# Patient Record
Sex: FEMALE | Race: WHITE | NOT HISPANIC OR LATINO
[De-identification: names, ages, dates, MRNs, and addresses within clinical notes are randomized per-mention and may not be internally consistent; named-entity substitution may affect disease eponyms.]

---

## 2017-01-04 ENCOUNTER — TRANSCRIPTION ENCOUNTER (OUTPATIENT)
Age: 22
End: 2017-01-04

## 2017-02-06 ENCOUNTER — APPOINTMENT (OUTPATIENT)
Dept: INTERNAL MEDICINE | Facility: CLINIC | Age: 22
End: 2017-02-06

## 2017-03-16 ENCOUNTER — APPOINTMENT (OUTPATIENT)
Dept: ENDOCRINOLOGY | Facility: CLINIC | Age: 22
End: 2017-03-16

## 2017-05-22 ENCOUNTER — OUTPATIENT (OUTPATIENT)
Dept: OUTPATIENT SERVICES | Facility: HOSPITAL | Age: 22
LOS: 1 days | Discharge: HOME | End: 2017-05-22

## 2017-06-28 DIAGNOSIS — R89.7 ABNORMAL HISTOLOGICAL FINDINGS IN SPECIMENS FROM OTHER ORGANS, SYSTEMS AND TISSUES: ICD-10-CM

## 2017-09-17 ENCOUNTER — EMERGENCY (EMERGENCY)
Facility: HOSPITAL | Age: 22
LOS: 0 days | Discharge: HOME | End: 2017-09-17
Admitting: PEDIATRICS

## 2017-09-17 DIAGNOSIS — Z79.899 OTHER LONG TERM (CURRENT) DRUG THERAPY: ICD-10-CM

## 2017-09-17 DIAGNOSIS — J45.909 UNSPECIFIED ASTHMA, UNCOMPLICATED: ICD-10-CM

## 2017-09-17 DIAGNOSIS — R11.2 NAUSEA WITH VOMITING, UNSPECIFIED: ICD-10-CM

## 2017-09-17 DIAGNOSIS — Z91.018 ALLERGY TO OTHER FOODS: ICD-10-CM

## 2017-09-17 DIAGNOSIS — K21.9 GASTRO-ESOPHAGEAL REFLUX DISEASE WITHOUT ESOPHAGITIS: ICD-10-CM

## 2017-09-17 DIAGNOSIS — Z91.010 ALLERGY TO PEANUTS: ICD-10-CM

## 2017-10-02 ENCOUNTER — OUTPATIENT (OUTPATIENT)
Dept: OUTPATIENT SERVICES | Facility: HOSPITAL | Age: 22
LOS: 1 days | Discharge: HOME | End: 2017-10-02

## 2017-10-02 DIAGNOSIS — Z32.00 ENCOUNTER FOR PREGNANCY TEST, RESULT UNKNOWN: ICD-10-CM

## 2017-10-02 DIAGNOSIS — D50.9 IRON DEFICIENCY ANEMIA, UNSPECIFIED: ICD-10-CM

## 2017-10-13 ENCOUNTER — EMERGENCY (EMERGENCY)
Facility: HOSPITAL | Age: 22
LOS: 0 days | Discharge: HOME | End: 2017-10-13
Admitting: PEDIATRICS

## 2017-10-13 DIAGNOSIS — Z91.018 ALLERGY TO OTHER FOODS: ICD-10-CM

## 2017-10-13 DIAGNOSIS — R11.2 NAUSEA WITH VOMITING, UNSPECIFIED: ICD-10-CM

## 2017-10-13 DIAGNOSIS — Z91.010 ALLERGY TO PEANUTS: ICD-10-CM

## 2017-10-13 DIAGNOSIS — Z79.899 OTHER LONG TERM (CURRENT) DRUG THERAPY: ICD-10-CM

## 2017-10-13 DIAGNOSIS — J45.909 UNSPECIFIED ASTHMA, UNCOMPLICATED: ICD-10-CM

## 2017-10-13 DIAGNOSIS — R19.7 DIARRHEA, UNSPECIFIED: ICD-10-CM

## 2017-10-16 ENCOUNTER — OUTPATIENT (OUTPATIENT)
Dept: OUTPATIENT SERVICES | Facility: HOSPITAL | Age: 22
LOS: 1 days | Discharge: HOME | End: 2017-10-16

## 2017-10-16 DIAGNOSIS — Z00.129 ENCOUNTER FOR ROUTINE CHILD HEALTH EXAMINATION WITHOUT ABNORMAL FINDINGS: ICD-10-CM

## 2017-10-16 DIAGNOSIS — Z00.00 ENCOUNTER FOR GENERAL ADULT MEDICAL EXAMINATION WITHOUT ABNORMAL FINDINGS: ICD-10-CM

## 2017-11-02 ENCOUNTER — OUTPATIENT (OUTPATIENT)
Dept: OUTPATIENT SERVICES | Facility: HOSPITAL | Age: 22
LOS: 1 days | Discharge: HOME | End: 2017-11-02

## 2017-11-02 DIAGNOSIS — Z00.00 ENCOUNTER FOR GENERAL ADULT MEDICAL EXAMINATION WITHOUT ABNORMAL FINDINGS: ICD-10-CM

## 2017-11-16 ENCOUNTER — EMERGENCY (EMERGENCY)
Facility: HOSPITAL | Age: 22
LOS: 0 days | Discharge: HOME | End: 2017-11-16
Admitting: PEDIATRICS

## 2017-11-16 DIAGNOSIS — R10.30 LOWER ABDOMINAL PAIN, UNSPECIFIED: ICD-10-CM

## 2017-11-16 DIAGNOSIS — K21.9 GASTRO-ESOPHAGEAL REFLUX DISEASE WITHOUT ESOPHAGITIS: ICD-10-CM

## 2017-11-16 DIAGNOSIS — R11.2 NAUSEA WITH VOMITING, UNSPECIFIED: ICD-10-CM

## 2017-11-16 DIAGNOSIS — J45.909 UNSPECIFIED ASTHMA, UNCOMPLICATED: ICD-10-CM

## 2017-11-16 DIAGNOSIS — Z91.010 ALLERGY TO PEANUTS: ICD-10-CM

## 2017-11-16 DIAGNOSIS — Z91.018 ALLERGY TO OTHER FOODS: ICD-10-CM

## 2017-12-18 ENCOUNTER — EMERGENCY (EMERGENCY)
Facility: HOSPITAL | Age: 22
LOS: 0 days | Discharge: HOME | End: 2017-12-19
Admitting: PEDIATRICS

## 2017-12-18 DIAGNOSIS — J45.909 UNSPECIFIED ASTHMA, UNCOMPLICATED: ICD-10-CM

## 2017-12-18 DIAGNOSIS — Z91.018 ALLERGY TO OTHER FOODS: ICD-10-CM

## 2017-12-18 DIAGNOSIS — Z79.51 LONG TERM (CURRENT) USE OF INHALED STEROIDS: ICD-10-CM

## 2017-12-18 DIAGNOSIS — R19.7 DIARRHEA, UNSPECIFIED: ICD-10-CM

## 2017-12-18 DIAGNOSIS — K52.9 NONINFECTIVE GASTROENTERITIS AND COLITIS, UNSPECIFIED: ICD-10-CM

## 2017-12-18 DIAGNOSIS — K21.9 GASTRO-ESOPHAGEAL REFLUX DISEASE WITHOUT ESOPHAGITIS: ICD-10-CM

## 2017-12-18 DIAGNOSIS — Z91.010 ALLERGY TO PEANUTS: ICD-10-CM

## 2017-12-18 DIAGNOSIS — Z79.899 OTHER LONG TERM (CURRENT) DRUG THERAPY: ICD-10-CM

## 2018-02-27 ENCOUNTER — OUTPATIENT (OUTPATIENT)
Dept: OUTPATIENT SERVICES | Facility: HOSPITAL | Age: 23
LOS: 1 days | Discharge: HOME | End: 2018-02-27

## 2018-02-28 DIAGNOSIS — F33.1 MAJOR DEPRESSIVE DISORDER, RECURRENT, MODERATE: ICD-10-CM

## 2018-03-09 ENCOUNTER — OUTPATIENT (OUTPATIENT)
Dept: OUTPATIENT SERVICES | Facility: HOSPITAL | Age: 23
LOS: 1 days | Discharge: HOME | End: 2018-03-09

## 2018-03-09 DIAGNOSIS — F33.1 MAJOR DEPRESSIVE DISORDER, RECURRENT, MODERATE: ICD-10-CM

## 2018-03-12 ENCOUNTER — EMERGENCY (EMERGENCY)
Facility: HOSPITAL | Age: 23
LOS: 0 days | Discharge: HOME | End: 2018-03-13
Attending: EMERGENCY MEDICINE | Admitting: PEDIATRICS

## 2018-03-12 VITALS
RESPIRATION RATE: 18 BRPM | DIASTOLIC BLOOD PRESSURE: 81 MMHG | TEMPERATURE: 98 F | HEART RATE: 78 BPM | OXYGEN SATURATION: 98 % | SYSTOLIC BLOOD PRESSURE: 131 MMHG
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
## 2018-03-12 DIAGNOSIS — F17.210 NICOTINE DEPENDENCE, CIGARETTES, UNCOMPLICATED: ICD-10-CM

## 2018-03-12 DIAGNOSIS — T74.21XA ADULT SEXUAL ABUSE, CONFIRMED, INITIAL ENCOUNTER: ICD-10-CM

## 2018-03-12 RX ORDER — AZITHROMYCIN 500 MG/1
1 TABLET, FILM COATED ORAL DAILY
Qty: 0 | Refills: 0 | Status: DISCONTINUED | OUTPATIENT
Start: 2018-03-12 | End: 2018-03-13

## 2018-03-12 RX ORDER — RALTEGRAVIR 400 MG/1
400 TABLET, FILM COATED ORAL ONCE
Qty: 0 | Refills: 0 | Status: COMPLETED | OUTPATIENT
Start: 2018-03-12 | End: 2018-03-12

## 2018-03-12 RX ORDER — LEVONORGESTREL 1.5 MG/1
1.5 TABLET ORAL ONCE
Qty: 0 | Refills: 0 | Status: COMPLETED | OUTPATIENT
Start: 2018-03-12 | End: 2018-03-12

## 2018-03-12 RX ORDER — CEFTRIAXONE 500 MG/1
250 INJECTION, POWDER, FOR SOLUTION INTRAMUSCULAR; INTRAVENOUS ONCE
Qty: 0 | Refills: 0 | Status: COMPLETED | OUTPATIENT
Start: 2018-03-12 | End: 2018-03-12

## 2018-03-12 RX ORDER — EMTRICITABINE AND TENOFOVIR DISOPROXIL FUMARATE 200; 300 MG/1; MG/1
1 TABLET, FILM COATED ORAL DAILY
Qty: 0 | Refills: 0 | Status: DISCONTINUED | OUTPATIENT
Start: 2018-03-12 | End: 2018-03-13

## 2018-03-12 RX ORDER — METRONIDAZOLE 500 MG
2000 TABLET ORAL ONCE
Qty: 0 | Refills: 0 | Status: COMPLETED | OUTPATIENT
Start: 2018-03-12 | End: 2018-03-12

## 2018-03-12 RX ORDER — METRONIDAZOLE 500 MG
2000 TABLET ORAL ONCE
Qty: 0 | Refills: 0 | Status: DISCONTINUED | OUTPATIENT
Start: 2018-03-12 | End: 2018-03-12

## 2018-03-12 NOTE — ED PROVIDER NOTE - PHYSICAL EXAMINATION
CONSTITUTIONAL: Well-developed; well-nourished; in no acute distress.   SKIN: warm, dry  HEAD: Normocephalic; atraumatic.  EYES: no conj injection  CARD: S1, S2 normal; no murmurs, gallops, or rubs. Regular rate and rhythm.   RESP: No wheezes, rales or rhonchi.  ABD: soft ntnd  EXT: Normal ROM.  No clubbing, cyanosis or edema.   NEURO: Alert, oriented, grossly unremarkable  PSYCH: Cooperative, appropriate. CONSTITUTIONAL: Well-developed; well-nourished; in no acute distress.   SKIN: warm, dry  HEAD: Normocephalic; atraumatic.  EYES: no conj injection  CARD: S1, S2 normal; no murmurs, gallops, or rubs. Regular rate and rhythm.   RESP: No wheezes, rales or rhonchi.  ABD: soft ntnd  : Chaperoned Vee Levybashirbrittany nl appearing external genitalia, no lesions or lacerations. No CMT no vaginal bleeding no discharge.   EXT: Normal ROM.  No clubbing, cyanosis or edema.   NEURO: Alert, oriented, grossly unremarkable  PSYCH: Cooperative, appropriate.

## 2018-03-12 NOTE — ED PROVIDER NOTE - NS ED ROS FT
Eyes:  No visual changes  ENMT:  No neck pain or stiffness.  Cardiac:  No chest pain, SOB   Respiratory:  No cough   GI:  No nausea, vomiting, diarrhea or abdominal pain.  MS:  No  back pain.  Neuro:  No headache  No LOC.  Skin:  No skin rash.
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
## 2018-03-13 VITALS
RESPIRATION RATE: 20 BRPM | HEART RATE: 78 BPM | DIASTOLIC BLOOD PRESSURE: 65 MMHG | OXYGEN SATURATION: 98 % | SYSTOLIC BLOOD PRESSURE: 110 MMHG

## 2018-03-13 LAB
ALBUMIN SERPL ELPH-MCNC: 4.3 G/DL — SIGNIFICANT CHANGE UP (ref 3–5.5)
ALP SERPL-CCNC: 69 U/L — SIGNIFICANT CHANGE UP (ref 30–115)
ALT FLD-CCNC: 15 U/L — SIGNIFICANT CHANGE UP (ref 0–41)
AMYLASE P1 CFR SERPL: 29 U/L — SIGNIFICANT CHANGE UP (ref 25–115)
ANION GAP SERPL CALC-SCNC: 10 MMOL/L — SIGNIFICANT CHANGE UP (ref 7–14)
AST SERPL-CCNC: 21 U/L — SIGNIFICANT CHANGE UP (ref 0–41)
BASOPHILS # BLD AUTO: 0.04 K/UL — SIGNIFICANT CHANGE UP (ref 0–0.2)
BASOPHILS NFR BLD AUTO: 0.5 % — SIGNIFICANT CHANGE UP (ref 0–1)
BILIRUB SERPL-MCNC: 0.8 MG/DL — SIGNIFICANT CHANGE UP (ref 0.2–1.2)
BUN SERPL-MCNC: 7 MG/DL — LOW (ref 10–20)
CALCIUM SERPL-MCNC: 9.7 MG/DL — SIGNIFICANT CHANGE UP (ref 8.5–10.1)
CHLORIDE SERPL-SCNC: 102 MMOL/L — SIGNIFICANT CHANGE UP (ref 98–110)
CK SERPL-CCNC: 157 U/L — SIGNIFICANT CHANGE UP (ref 0–225)
CO2 SERPL-SCNC: 26 MMOL/L — SIGNIFICANT CHANGE UP (ref 17–32)
CREAT SERPL-MCNC: 0.7 MG/DL — SIGNIFICANT CHANGE UP (ref 0.7–1.5)
EOSINOPHIL # BLD AUTO: 0.05 K/UL — SIGNIFICANT CHANGE UP (ref 0–0.7)
EOSINOPHIL NFR BLD AUTO: 0.7 % — SIGNIFICANT CHANGE UP (ref 0–8)
GLUCOSE SERPL-MCNC: 85 MG/DL — SIGNIFICANT CHANGE UP (ref 70–110)
HCT VFR BLD CALC: 37.4 % — SIGNIFICANT CHANGE UP (ref 37–47)
HGB BLD-MCNC: 12.9 G/DL — SIGNIFICANT CHANGE UP (ref 12–16)
HIV 1 & 2 AB SERPL IA.RAPID: SIGNIFICANT CHANGE UP
IMM GRANULOCYTES NFR BLD AUTO: 0.3 % — SIGNIFICANT CHANGE UP (ref 0.1–0.3)
LYMPHOCYTES # BLD AUTO: 2.19 K/UL — SIGNIFICANT CHANGE UP (ref 1.2–3.4)
LYMPHOCYTES # BLD AUTO: 29.1 % — SIGNIFICANT CHANGE UP (ref 20.5–51.1)
MCHC RBC-ENTMCNC: 29.3 PG — SIGNIFICANT CHANGE UP (ref 27–31)
MCHC RBC-ENTMCNC: 34.5 G/DL — SIGNIFICANT CHANGE UP (ref 32–37)
MCV RBC AUTO: 84.8 FL — SIGNIFICANT CHANGE UP (ref 81–99)
MONOCYTES # BLD AUTO: 0.54 K/UL — SIGNIFICANT CHANGE UP (ref 0.1–0.6)
MONOCYTES NFR BLD AUTO: 7.2 % — SIGNIFICANT CHANGE UP (ref 1.7–9.3)
NEUTROPHILS # BLD AUTO: 4.68 K/UL — SIGNIFICANT CHANGE UP (ref 1.4–6.5)
NEUTROPHILS NFR BLD AUTO: 62.2 % — SIGNIFICANT CHANGE UP (ref 42.2–75.2)
NRBC # BLD: 0 /100 WBCS — SIGNIFICANT CHANGE UP (ref 0–0)
PLATELET # BLD AUTO: 244 K/UL — SIGNIFICANT CHANGE UP (ref 130–400)
POTASSIUM SERPL-MCNC: 3.8 MMOL/L — SIGNIFICANT CHANGE UP (ref 3.5–5)
POTASSIUM SERPL-SCNC: 3.8 MMOL/L — SIGNIFICANT CHANGE UP (ref 3.5–5)
PROT SERPL-MCNC: 6.4 G/DL — SIGNIFICANT CHANGE UP (ref 6–8)
RBC # BLD: 4.41 M/UL — SIGNIFICANT CHANGE UP (ref 4.2–5.4)
RBC # FLD: 13.1 % — SIGNIFICANT CHANGE UP (ref 11.5–14.5)
SODIUM SERPL-SCNC: 138 MMOL/L — SIGNIFICANT CHANGE UP (ref 135–146)
WBC # BLD: 7.52 K/UL — SIGNIFICANT CHANGE UP (ref 4.8–10.8)
WBC # FLD AUTO: 7.52 K/UL — SIGNIFICANT CHANGE UP (ref 4.8–10.8)

## 2018-03-13 RX ORDER — METRONIDAZOLE 500 MG
2000 TABLET ORAL ONCE
Qty: 0 | Refills: 0 | Status: DISCONTINUED | OUTPATIENT
Start: 2018-03-13 | End: 2018-03-13

## 2018-03-13 RX ORDER — RALTEGRAVIR 400 MG/1
400 TABLET, FILM COATED ORAL ONCE
Qty: 0 | Refills: 0 | Status: DISCONTINUED | OUTPATIENT
Start: 2018-03-13 | End: 2018-03-13

## 2018-03-13 RX ORDER — EMTRICITABINE AND TENOFOVIR DISOPROXIL FUMARATE 200; 300 MG/1; MG/1
1 TABLET, FILM COATED ORAL DAILY
Qty: 0 | Refills: 0 | Status: DISCONTINUED | OUTPATIENT
Start: 2018-03-13 | End: 2018-03-13

## 2018-03-13 RX ORDER — AZITHROMYCIN 500 MG/1
1 TABLET, FILM COATED ORAL ONCE
Qty: 0 | Refills: 0 | Status: COMPLETED | OUTPATIENT
Start: 2018-03-13 | End: 2018-03-13

## 2018-03-13 RX ORDER — ONDANSETRON 8 MG/1
8 TABLET, FILM COATED ORAL ONCE
Qty: 0 | Refills: 0 | Status: COMPLETED | OUTPATIENT
Start: 2018-03-13 | End: 2018-03-13

## 2018-03-13 RX ORDER — METRONIDAZOLE 500 MG
2000 TABLET ORAL ONCE
Qty: 0 | Refills: 0 | Status: COMPLETED | OUTPATIENT
Start: 2018-03-13 | End: 2018-03-13

## 2018-03-13 RX ADMIN — EMTRICITABINE AND TENOFOVIR DISOPROXIL FUMARATE 1 TABLET(S): 200; 300 TABLET, FILM COATED ORAL at 03:21

## 2018-03-13 RX ADMIN — AZITHROMYCIN 1 GRAM(S): 500 TABLET, FILM COATED ORAL at 04:32

## 2018-03-13 RX ADMIN — Medication 10 MILLIGRAM(S): at 03:21

## 2018-03-13 RX ADMIN — LEVONORGESTREL 1.5 MILLIGRAM(S): 1.5 TABLET ORAL at 03:21

## 2018-03-13 RX ADMIN — ONDANSETRON 8 MILLIGRAM(S): 8 TABLET, FILM COATED ORAL at 03:57

## 2018-03-13 RX ADMIN — Medication 2000 MILLIGRAM(S): at 03:21

## 2018-03-13 RX ADMIN — EMTRICITABINE AND TENOFOVIR DISOPROXIL FUMARATE 1 TABLET(S): 200; 300 TABLET, FILM COATED ORAL at 04:25

## 2018-03-13 RX ADMIN — AZITHROMYCIN 1 GRAM(S): 500 TABLET, FILM COATED ORAL at 03:20

## 2018-03-13 RX ADMIN — Medication 2000 MILLIGRAM(S): at 04:32

## 2018-03-13 RX ADMIN — RALTEGRAVIR 400 MILLIGRAM(S): 400 TABLET, FILM COATED ORAL at 03:21

## 2018-03-13 RX ADMIN — CEFTRIAXONE 250 MILLIGRAM(S): 500 INJECTION, POWDER, FOR SOLUTION INTRAMUSCULAR; INTRAVENOUS at 03:10
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
## 2018-03-14 LAB
C TRACH RRNA SPEC QL NAA+PROBE: SIGNIFICANT CHANGE UP
HAV IGM SER-ACNC: SIGNIFICANT CHANGE UP
HBV CORE AB SER-ACNC: SIGNIFICANT CHANGE UP
HBV CORE IGM SER-ACNC: SIGNIFICANT CHANGE UP
HBV SURFACE AG SER-ACNC: SIGNIFICANT CHANGE UP
HBV SURFACE AG SER-ACNC: SIGNIFICANT CHANGE UP
HCV AB S/CO SERPL IA: 0.09 S/CO — SIGNIFICANT CHANGE UP
HCV AB SERPL-IMP: SIGNIFICANT CHANGE UP
N GONORRHOEA RRNA SPEC QL NAA+PROBE: SIGNIFICANT CHANGE UP
SPECIMEN SOURCE: SIGNIFICANT CHANGE UP
T PALLIDUM AB TITR SER: NEGATIVE — SIGNIFICANT CHANGE UP

## 2018-04-17 ENCOUNTER — OUTPATIENT (OUTPATIENT)
Dept: OUTPATIENT SERVICES | Facility: HOSPITAL | Age: 23
LOS: 1 days | Discharge: HOME | End: 2018-04-17

## 2018-04-17 DIAGNOSIS — F33.1 MAJOR DEPRESSIVE DISORDER, RECURRENT, MODERATE: ICD-10-CM

## 2018-05-16 ENCOUNTER — EMERGENCY (EMERGENCY)
Facility: HOSPITAL | Age: 23
LOS: 1 days | Discharge: LEFT BEFORE DISCHARGE | End: 2018-05-16
Attending: EMERGENCY MEDICINE

## 2018-05-16 VITALS
TEMPERATURE: 97 F | SYSTOLIC BLOOD PRESSURE: 130 MMHG | OXYGEN SATURATION: 100 % | RESPIRATION RATE: 16 BRPM | HEART RATE: 101 BPM | DIASTOLIC BLOOD PRESSURE: 71 MMHG

## 2018-05-16 DIAGNOSIS — R53.1 WEAKNESS: ICD-10-CM

## 2018-05-16 DIAGNOSIS — Z79.899 OTHER LONG TERM (CURRENT) DRUG THERAPY: ICD-10-CM

## 2018-05-16 DIAGNOSIS — M25.562 PAIN IN LEFT KNEE: ICD-10-CM

## 2018-05-16 NOTE — ED ADULT TRIAGE NOTE - CHIEF COMPLAINT QUOTE
"I can't use my legs. I've been getting around in a wheelchair for the last month. Today my knees are hurting me too."

## 2018-05-16 NOTE — ED PROVIDER NOTE - OBJECTIVE STATEMENT
24 yo F hx of mild MR p/w b/l leg weakness going on for once month, having difficulty standing and walking, using mother's wheelchair at home, pt denies any recent hospitalizations or visits to the emergency department

## 2018-05-16 NOTE — ED PROVIDER NOTE - PHYSICAL EXAMINATION
AOx4, Non toxic appearing, NAD. Skin  warm and dry, no acute rash. PERRLA/EOM, conjunctiva and sclera clear. MM moist, no nasal discharge.  Pharynx and TM's unremarkable. Neck supple nt, no meningeal signs. Heart RRR s1s2 nl, no rub/murmur. Lungs- BS equal, CTAB. Abdomen soft ntnd no r/g. Extremities- moves all, +equal distal pulses, sensation wnl, normal ROM. stands up without difficulty.
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
## 2018-05-16 NOTE — ED PROVIDER NOTE - ATTENDING CONTRIBUTION TO CARE
23 year old female, come sin with complaint of weakness and difficulty amnulating, started 1 month ago post fall, + left knee pain, + generalized weakness, no fever, no cp/sob, no n/v/d, no loc, no other trauma. No ivdu    CONSTITUTIONAL: Well-developed; well-nourished; in no acute distress. Sitting up and providing appropriate history and physical examination  SKIN: skin exam is warm and dry, no acute rash.  HEAD: Normocephalic; atraumatic.  EYES: PERRL, 3 mm bilateral, no nystagmus, EOM intact; conjunctiva and sclera clear.  ENT: No nasal discharge; airway clear.  NECK: Supple; non tender.+ full passive ROM in all directions. No JVD  CARD: S1, S2 normal; no murmurs, gallops, or rubs. Regular rate and rhythm. + Symmetric Strong Pulses  RESP: No wheezes, rales or rhonchi. Good air movement bilaterally  ABD: soft; non-distended; non-tender. No Rebound, No Guarding, No signs of peritnitis, No CVA tenderness  EXT: Normal ROM. No clubbing, cyanosis or edema. Dp and Pt Pulses intact. Cap refill less than 3 seconds. + unable to stand, + endorses weakness, strength 2/5 bilateral. + left prox tubia tenderness, no signs of trauma  NEURO: CN 2-12 intact, normal finger to nose, normal romberg, no sensory deficits, Alert, oriented, grossly unremarkable. GCS 15. NIH 0  PSYCH: Cooperative, appropriate.

## 2018-05-16 NOTE — ED PROVIDER NOTE - NS ED ROS FT
Pt denied fvr/chills, HA, visual changes, dizziness, light headedness, presyncope, LOC, CP, HAMMOND, PND, SOB, cough, hemoptysis, abd pain, n/v/d, changes in bowel or bladder habits.  The pt also denied recent travel outside of the country, recent illnesses, sick contacts, recent airplane trips or periods of immobility/bedbound.

## 2018-05-18 ENCOUNTER — EMERGENCY (EMERGENCY)
Facility: HOSPITAL | Age: 23
LOS: 0 days | Discharge: HOME | End: 2018-05-18
Attending: EMERGENCY MEDICINE | Admitting: EMERGENCY MEDICINE

## 2018-05-18 VITALS
HEART RATE: 110 BPM | OXYGEN SATURATION: 98 % | RESPIRATION RATE: 18 BRPM | SYSTOLIC BLOOD PRESSURE: 122 MMHG | DIASTOLIC BLOOD PRESSURE: 67 MMHG | TEMPERATURE: 98 F

## 2018-05-18 VITALS
RESPIRATION RATE: 18 BRPM | DIASTOLIC BLOOD PRESSURE: 58 MMHG | OXYGEN SATURATION: 100 % | SYSTOLIC BLOOD PRESSURE: 116 MMHG | TEMPERATURE: 97 F | HEART RATE: 97 BPM

## 2018-05-18 DIAGNOSIS — F41.9 ANXIETY DISORDER, UNSPECIFIED: ICD-10-CM

## 2018-05-18 DIAGNOSIS — M25.562 PAIN IN LEFT KNEE: ICD-10-CM

## 2018-05-18 DIAGNOSIS — M25.561 PAIN IN RIGHT KNEE: ICD-10-CM

## 2018-05-18 LAB
ALBUMIN SERPL ELPH-MCNC: 4.2 G/DL — SIGNIFICANT CHANGE UP (ref 3.5–5.2)
ALP SERPL-CCNC: 90 U/L — SIGNIFICANT CHANGE UP (ref 30–115)
ALT FLD-CCNC: 10 U/L — SIGNIFICANT CHANGE UP (ref 0–41)
ANION GAP SERPL CALC-SCNC: 11 MMOL/L — SIGNIFICANT CHANGE UP (ref 7–14)
AST SERPL-CCNC: 16 U/L — SIGNIFICANT CHANGE UP (ref 0–41)
BASOPHILS # BLD AUTO: 0.06 K/UL — SIGNIFICANT CHANGE UP (ref 0–0.2)
BASOPHILS NFR BLD AUTO: 0.8 % — SIGNIFICANT CHANGE UP (ref 0–1)
BILIRUB SERPL-MCNC: 0.2 MG/DL — SIGNIFICANT CHANGE UP (ref 0.2–1.2)
BUN SERPL-MCNC: 11 MG/DL — SIGNIFICANT CHANGE UP (ref 10–20)
CALCIUM SERPL-MCNC: 9.1 MG/DL — SIGNIFICANT CHANGE UP (ref 8.5–10.1)
CHLORIDE SERPL-SCNC: 104 MMOL/L — SIGNIFICANT CHANGE UP (ref 98–110)
CK SERPL-CCNC: 75 U/L — SIGNIFICANT CHANGE UP (ref 0–225)
CO2 SERPL-SCNC: 24 MMOL/L — SIGNIFICANT CHANGE UP (ref 17–32)
CREAT SERPL-MCNC: 0.7 MG/DL — SIGNIFICANT CHANGE UP (ref 0.7–1.5)
EOSINOPHIL # BLD AUTO: 0.38 K/UL — SIGNIFICANT CHANGE UP (ref 0–0.7)
EOSINOPHIL NFR BLD AUTO: 5.1 % — SIGNIFICANT CHANGE UP (ref 0–8)
ERYTHROCYTE [SEDIMENTATION RATE] IN BLOOD: 6 MM/HR — SIGNIFICANT CHANGE UP (ref 0–15)
GLUCOSE SERPL-MCNC: 91 MG/DL — SIGNIFICANT CHANGE UP (ref 70–99)
HCT VFR BLD CALC: 41.3 % — SIGNIFICANT CHANGE UP (ref 37–47)
HGB BLD-MCNC: 14 G/DL — SIGNIFICANT CHANGE UP (ref 12–16)
IMM GRANULOCYTES NFR BLD AUTO: 0.1 % — SIGNIFICANT CHANGE UP (ref 0.1–0.3)
LYMPHOCYTES # BLD AUTO: 2.46 K/UL — SIGNIFICANT CHANGE UP (ref 1.2–3.4)
LYMPHOCYTES # BLD AUTO: 33.3 % — SIGNIFICANT CHANGE UP (ref 20.5–51.1)
MCHC RBC-ENTMCNC: 28.7 PG — SIGNIFICANT CHANGE UP (ref 27–31)
MCHC RBC-ENTMCNC: 33.9 G/DL — SIGNIFICANT CHANGE UP (ref 32–37)
MCV RBC AUTO: 84.8 FL — SIGNIFICANT CHANGE UP (ref 81–99)
MONOCYTES # BLD AUTO: 0.52 K/UL — SIGNIFICANT CHANGE UP (ref 0.1–0.6)
MONOCYTES NFR BLD AUTO: 7 % — SIGNIFICANT CHANGE UP (ref 1.7–9.3)
NEUTROPHILS # BLD AUTO: 3.95 K/UL — SIGNIFICANT CHANGE UP (ref 1.4–6.5)
NEUTROPHILS NFR BLD AUTO: 53.7 % — SIGNIFICANT CHANGE UP (ref 42.2–75.2)
NRBC # BLD: 0 /100 WBCS — SIGNIFICANT CHANGE UP (ref 0–0)
PLATELET # BLD AUTO: 261 K/UL — SIGNIFICANT CHANGE UP (ref 130–400)
POTASSIUM SERPL-MCNC: 4.3 MMOL/L — SIGNIFICANT CHANGE UP (ref 3.5–5)
POTASSIUM SERPL-SCNC: 4.3 MMOL/L — SIGNIFICANT CHANGE UP (ref 3.5–5)
PROT SERPL-MCNC: 6.7 G/DL — SIGNIFICANT CHANGE UP (ref 6–8)
RBC # BLD: 4.87 M/UL — SIGNIFICANT CHANGE UP (ref 4.2–5.4)
RBC # FLD: 13.2 % — SIGNIFICANT CHANGE UP (ref 11.5–14.5)
SODIUM SERPL-SCNC: 139 MMOL/L — SIGNIFICANT CHANGE UP (ref 135–146)
WBC # BLD: 7.38 K/UL — SIGNIFICANT CHANGE UP (ref 4.8–10.8)
WBC # FLD AUTO: 7.38 K/UL — SIGNIFICANT CHANGE UP (ref 4.8–10.8)

## 2018-05-18 NOTE — ED PROVIDER NOTE - ATTENDING CONTRIBUTION TO CARE
I have personally performed a history and physical exam on this patient and personally directed the management of the patient. Patient presents for evaluation of b/l knee pain.  it is b/l throbbing moderate in nature she denies any trauma or falls.  She denies any redness.  She denies any fevers or chills. On physical exam sshe is nc/at perrla eomi oropharynx clear cta b/l, rrr s1s2 noted abd-soft nt nd bs +e x t - patient has ttp of the b/l patella, worse with palpation no redness no swelling pedal pulses 2 +=.  we obtained xrays, labs and administered IVfluids, we advised potential admission as the patient is unable to ambualte but prior to completing treatment she eloped from the department.
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
## 2018-05-18 NOTE — ED PROVIDER NOTE - NS ED ROS FT
CONST: + chills. No fever, or bodyaches  EYES: No pain, redness, drainage or visual changes.  ENT: No ear pain or discharge, nasal discharge or congestion. No sore throat  CARD: No chest pain, palpitations, no edema of lower extremities  RESP: No SOB, cough, hemoptysis  GI: No abdominal pain, N/V/D  MS: + knee pain, + pain of lower extremities, no calf pain,   SKIN: No rashes  NEURO: + weakness of lower extremities. No headache, dizziness, paresthesias CONST: + chills. No fever, or body aches  EYES: No pain, redness, drainage or visual changes.  ENT: No ear pain or discharge, nasal discharge or congestion. No sore throat  CARD: No chest pain, palpitations, no edema of lower extremities  RESP: No SOB, cough, hemoptysis  GI: No abdominal pain, N/V/D  MS: + knee pain, + pain of lower extremities, no calf pain,   SKIN: No rashes  NEURO: + weakness of lower extremities. No headache, dizziness, paresthesias

## 2018-05-18 NOTE — ED PROVIDER NOTE - PROGRESS NOTE DETAILS
Discussed results with pt and need for admission.  She refuses admission.  Pt was not initially able to ambulate but she is now able to ambulate out of the ED without difficulty. Pt eloped before AMA paperwork could be filled out.

## 2018-05-18 NOTE — ED ADULT NURSE NOTE - OBJECTIVE STATEMENT
Patient presents c/o B/L LE weakness x 2 days. Patient states weakness has become worse. Patient states her fiance has to assist her to the bathroom and her legs give out. Denies fever or urinary symptoms. States has been having to use wheelchair to get around

## 2018-05-18 NOTE — ED PROVIDER NOTE - OBJECTIVE STATEMENT
23 y.o female with hx of MR and anxiety presents to the ED for evaluation of weakness of lower extremities for past few weeks.  Over the past week the weakness has become worse and over the past 2 days pt is not able to ambulate without assistance.  She states that her legs give out from underneath her and her significant other has to carry her to the bathroom.  pt has been getting around in a wheel chair. Pt was seen last night and eloped because she did not want blood work.  pt returns today because she is still not able to bear weight. Admits to chills.  Denies urinary sx, urinary incontinence, bowel incontinence, urinary retention, fever, swelling of lower extremities, recent surgery, hemoptysis, cough, hormone replacement therapy. No recent URIs or trauma.

## 2018-05-18 NOTE — ED PROVIDER NOTE - PHYSICAL EXAMINATION
Gen: Alert, NAD, well appearing  Head: NC, AT, PERRL, EOMI  Neck: +supple, no tenderness  Pulm: Bilateral BS, normal resp effort  CV: RRR, no M/R/G  Abd: soft, NT/ND, +BS  Mskel: no edema, +generalized tenderness to bilateral lower extremity, +poor compliance with exam when asked to move legs, +pedal pulses bilaterally  Skin: no rash, warm/dry  Neuro: AAOx3, no sensory/motor deficits, CN 2-12 intact

## 2018-05-22 ENCOUNTER — INPATIENT (INPATIENT)
Facility: HOSPITAL | Age: 23
LOS: 0 days | Discharge: LEFT BEFORE DISCHARGE | End: 2018-05-23
Attending: INTERNAL MEDICINE | Admitting: INTERNAL MEDICINE

## 2018-05-22 VITALS
TEMPERATURE: 208 F | RESPIRATION RATE: 17 BRPM | SYSTOLIC BLOOD PRESSURE: 128 MMHG | DIASTOLIC BLOOD PRESSURE: 73 MMHG | HEART RATE: 82 BPM

## 2018-05-22 LAB
ALBUMIN SERPL ELPH-MCNC: 4.4 G/DL — SIGNIFICANT CHANGE UP (ref 3.5–5.2)
ALP SERPL-CCNC: 96 U/L — SIGNIFICANT CHANGE UP (ref 30–115)
ALT FLD-CCNC: 9 U/L — SIGNIFICANT CHANGE UP (ref 0–41)
ANION GAP SERPL CALC-SCNC: 16 MMOL/L — HIGH (ref 7–14)
APPEARANCE UR: (no result)
AST SERPL-CCNC: 16 U/L — SIGNIFICANT CHANGE UP (ref 0–41)
BACTERIA # UR AUTO: (no result) /HPF
BILIRUB SERPL-MCNC: <0.2 MG/DL — SIGNIFICANT CHANGE UP (ref 0.2–1.2)
BILIRUB UR-MCNC: NEGATIVE — SIGNIFICANT CHANGE UP
BUN SERPL-MCNC: 10 MG/DL — SIGNIFICANT CHANGE UP (ref 10–20)
CALCIUM SERPL-MCNC: 9.6 MG/DL — SIGNIFICANT CHANGE UP (ref 8.5–10.1)
CHLORIDE SERPL-SCNC: 100 MMOL/L — SIGNIFICANT CHANGE UP (ref 98–110)
CO2 SERPL-SCNC: 25 MMOL/L — SIGNIFICANT CHANGE UP (ref 17–32)
COLOR SPEC: YELLOW — SIGNIFICANT CHANGE UP
CREAT SERPL-MCNC: 0.7 MG/DL — SIGNIFICANT CHANGE UP (ref 0.7–1.5)
DIFF PNL FLD: (no result)
EPI CELLS # UR: (no result) /HPF
GLUCOSE SERPL-MCNC: 96 MG/DL — SIGNIFICANT CHANGE UP (ref 70–99)
GLUCOSE UR QL: NEGATIVE MG/DL — SIGNIFICANT CHANGE UP
HCT VFR BLD CALC: 43.4 % — SIGNIFICANT CHANGE UP (ref 37–47)
HGB BLD-MCNC: 14.7 G/DL — SIGNIFICANT CHANGE UP (ref 12–16)
KETONES UR-MCNC: NEGATIVE — SIGNIFICANT CHANGE UP
LEUKOCYTE ESTERASE UR-ACNC: (no result)
MCHC RBC-ENTMCNC: 28.8 PG — SIGNIFICANT CHANGE UP (ref 27–31)
MCHC RBC-ENTMCNC: 33.9 G/DL — SIGNIFICANT CHANGE UP (ref 32–37)
MCV RBC AUTO: 84.9 FL — SIGNIFICANT CHANGE UP (ref 81–99)
NITRITE UR-MCNC: NEGATIVE — SIGNIFICANT CHANGE UP
NRBC # BLD: 0 /100 WBCS — SIGNIFICANT CHANGE UP (ref 0–0)
PH UR: 6 — SIGNIFICANT CHANGE UP (ref 5–8)
PLATELET # BLD AUTO: 277 K/UL — SIGNIFICANT CHANGE UP (ref 130–400)
POTASSIUM SERPL-MCNC: 4.2 MMOL/L — SIGNIFICANT CHANGE UP (ref 3.5–5)
POTASSIUM SERPL-SCNC: 4.2 MMOL/L — SIGNIFICANT CHANGE UP (ref 3.5–5)
PROT SERPL-MCNC: 7.1 G/DL — SIGNIFICANT CHANGE UP (ref 6–8)
PROT UR-MCNC: (no result) MG/DL
RBC # BLD: 5.11 M/UL — SIGNIFICANT CHANGE UP (ref 4.2–5.4)
RBC # FLD: 13.2 % — SIGNIFICANT CHANGE UP (ref 11.5–14.5)
SODIUM SERPL-SCNC: 141 MMOL/L — SIGNIFICANT CHANGE UP (ref 135–146)
SP GR SPEC: 1.02 — SIGNIFICANT CHANGE UP (ref 1.01–1.03)
UROBILINOGEN FLD QL: 1 MG/DL (ref 0.2–0.2)
WBC # BLD: 7.71 K/UL — SIGNIFICANT CHANGE UP (ref 4.8–10.8)
WBC # FLD AUTO: 7.71 K/UL — SIGNIFICANT CHANGE UP (ref 4.8–10.8)
WBC UR QL: SIGNIFICANT CHANGE UP /HPF

## 2018-05-22 RX ORDER — ACETAMINOPHEN 500 MG
650 TABLET ORAL ONCE
Qty: 0 | Refills: 0 | Status: COMPLETED | OUTPATIENT
Start: 2018-05-22 | End: 2018-05-22

## 2018-05-22 RX ORDER — METHOCARBAMOL 500 MG/1
1000 TABLET, FILM COATED ORAL ONCE
Qty: 0 | Refills: 0 | Status: COMPLETED | OUTPATIENT
Start: 2018-05-22 | End: 2018-05-23

## 2018-05-22 RX ADMIN — Medication 650 MILLIGRAM(S): at 21:54
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
## 2018-05-22 NOTE — ED PROVIDER NOTE - ATTENDING CONTRIBUTION TO CARE
23 y F PMH Anxiety, pw bilat knee pain and pain throughout b/l legs, worsening x 1 month since a fall with b/l knee and back injury. States she has been unable to walk for 23 y F PMH Anxiety, pw bilat knee pain and pain throughout b/l legs, worsening x 1 month since a fall with b/l knee and back injury. States she has been unable to walk for 1 month.  Exam: NAD, NCAT, HEENT: mmm, EOMI, PERRLA, Neck: supple, nontender, nl ROM, Heart: RRR, no murmur, Lungs: BCTA, no signs of increased WOB, Abd: NTND, no guarding or rebound, no hernia palpated, no CVAT. MSK: + diffuse lower lumbar midline and bl tenderness. no stepoff. bl le tender diffusely throughout thighs, knees, and lower legs. nl rom though with pain, no deformity. no edema. Neuro: A&Ox3, CN II-XII intact, normal sensation throughout, Strength decreased b/l LE, 3/5. Pain with movement. Normal speech and coordination.   A/P: Eval for spine bony injury, spinal cord pathology. Consult neurology. Labs, imaging, likely admit for failure to ambulate.

## 2018-05-22 NOTE — ED PROVIDER NOTE - GASTROINTESTINAL NEGATIVE STATEMENT, MLM
imaging negative for fractures or cervical cord compression  will clear c spine  dc home with parents  will discuss with attending
some abdominal pain, no bloating, no constipation, no diarrhea, no nausea and no vomiting.

## 2018-05-22 NOTE — ED PROVIDER NOTE - OBJECTIVE STATEMENT
This is a 24yo F with history of anxiety who presents with bilateral knee and back pain with the inability to walk for the past month after falling into a pothole 1 month ago. This is a 22yo F with history of anxiety who presents with bilateral knee and back pain with the inability to walk for the past month after falling into a pothole 1 month ago. She states she cannot stand up and has been using her mother's wheel chair for the past month. She denies any chest pain, admits to severe back pain, states she has some abdominal pain, denies any urinary symptoms or changes in bowel movements. She denies any other acute complaints upon review of systems.

## 2018-05-22 NOTE — ED ADULT TRIAGE NOTE - CHIEF COMPLAINT QUOTE
"I stepped into a pothole and fell a month ago. My back has been hurting since I fell, I can't walk."

## 2018-05-22 NOTE — H&P ADULT - ASSESSMENT
24 y/o F with PMH of anxiety presented for inability to ambulate for 2 weeks.    1.) Inability to ambulate secondary to b/l LE pain: Unclear etiology. Differential includes history of knee trauma, possible lyme arthritis, or psychogenic etiology.    - Neurology consulted: Recommend MRI of L-spin non-contrast. Robaxin PRN for pain    - Neurological exam is inconsistent. Unwillingness to ambulate is voluntary secondary to perceived pain rather than a neurological deficit.    - Order lyme serological testing    2.) Anxiety: Continue paroxetine and olazapine    3.) GI / DVT PPx: not indicated / lovenox    4.) Disposition:    - Full Code.

## 2018-05-22 NOTE — ED PROVIDER NOTE - CONSTITUTIONAL, MLM
normal... Well appearing, well nourished, awake, alert, oriented to person, place, time/situation and in moderate distress, in wheelchair

## 2018-05-22 NOTE — H&P ADULT - NSHPPHYSICALEXAM_GEN_ALL_CORE
Height (cm): 165.1 (05-22-18 @ 20:50)  Weight (kg): 59 (05-22-18 @ 20:50)  BMI (kg/m2): 21.6 (05-22-18 @ 20:50)  BSA (m2): 1.65 (05-22-18 @ 20:50)  T(F): 97.6 (05-22-18 @ 19:37), Max: 207.6 (05-22-18 @ 19:33)  HR: 82 (05-22-18 @ 19:37) (82 - 82)  BP: 128/73 (05-22-18 @ 19:37) (128/73 - 128/73)  RR: 16 (05-22-18 @ 19:37) (16 - 17)  SpO2: 98% (05-22-18 @ 19:37) (98% - 98%)    Physical Exam:  General: Not in distress.   HEENT: Moist mucus membranes. PERRLA.  Cardio: Regular rate and rhythm, S1, S2, no murmur, rub, or gallop.  Pulm: Clear to auscultation bilaterally. No wheezing, rales, or rhonchi.  Abdomen: Soft, non-tender, non-distended. Normoactive bowel sounds.  Extremities: No cyanosis or edema bilaterally. No calf tenderness to palpation.  Neuro: A&O x3. No focal deficits. CN II - XII grossly intact. Height (cm): 165.1 (05-22-18 @ 20:50)  Weight (kg): 59 (05-22-18 @ 20:50)  BMI (kg/m2): 21.6 (05-22-18 @ 20:50)  BSA (m2): 1.65 (05-22-18 @ 20:50)  T(F): 97.6 (05-22-18 @ 19:37), Max: 207.6 (05-22-18 @ 19:33)  HR: 82 (05-22-18 @ 19:37) (82 - 82)  BP: 128/73 (05-22-18 @ 19:37) (128/73 - 128/73)  RR: 16 (05-22-18 @ 19:37) (16 - 17)  SpO2: 98% (05-22-18 @ 19:37) (98% - 98%)    Physical Exam:  General: Not in distress. anxious.  HEENT: Moist mucus membranes. PERRLA.  Cardio: Regular rate and rhythm, S1, S2, no murmur, rub, or gallop.  Pulm: Clear to auscultation bilaterally. No wheezing, rales, or rhonchi.  Abdomen: Soft, non-tender, non-distended. Normoactive bowel sounds.  Extremities: No cyanosis or edema bilaterally. No calf tenderness to palpation.  Neuro: A&O x3. Active motion limited by pain. Patient able to maintain muscle tone against resistance. Able to support weight on both lower extremities. No focal deficits noted. Patient refuses to ambulate due to knee pain.

## 2018-05-22 NOTE — CONSULT NOTE ADULT - ASSESSMENT
24 yo right handed female with h/o anxiety , chronic back pain and frequent falls since age 14 and a fall 1 month ago with injury to the knees followed by progressively worsening lower extremity weakness and pain and inability to ambulate.     r/o LUMBAR RADICULOPATHY    plan  -mri l spine n/c  -Start Robaxin 500 mg q 8 and prn  -Neuro attending note to follow

## 2018-05-22 NOTE — H&P ADULT - NSHPLABSRESULTS_GEN_ALL_CORE
CBC Full  -  ( 22 May 2018 21:36 )  WBC Count : 7.71 K/uL  Hemoglobin : 14.7 g/dL  Hematocrit : 43.4 %  Platelet Count - Automated : 277 K/uL  Mean Cell Volume : 84.9 fL  Mean Cell Hemoglobin : 28.8 pg  Mean Cell Hemoglobin Concentration : 33.9 g/dL    BMP: 05-22-18 @ 21:36  141 | 100 | 10   -----------------< 96  4.2  | 25 | 0.7  eGFR(AA): 143, eGFR (non-AA): 123  Ca 9.6, Mg --, P --    LFTs: 05-22-18 @ 21:36  TP  7.1  | 4.4 Alb   ---------------  TB  <0.2  | --  DB   ---------------  ALT 9   | 16  AST            ^          96  ALK    Urinalysis (05.22.18 @ 21:45)    Glucose Qualitative, Urine: Negative mg/dL    Blood, Urine: Large    pH Urine: 6.0    Color: Yellow    Urine Appearance: Cloudy    Bilirubin: Negative    Ketone - Urine: Negative    Specific Gravity: 1.020    Protein, Urine: Trace mg/dL    Urobilinogen: 1.0 mg/dL    Nitrite: Negative    Leukocyte Esterase Concentration: Trace  Urine Microscopic-Add On (NC) (05.22.18 @ 21:45)    White Blood Cell - Urine: 3-5 /HPF    Epithelial Cells: Few /HPF    Bacteria: Few /HPF    X-ray Knee 3 Views, Bilateral (05.18.18 @ 17:15):  No acute fracture or joint malalignment or suprapatellar joint effusions. No radiopaque foreign body  Joint spaces demonstrate mild prominence of bilateral lateral compartments which may suggest the presence of an underlying discoid meniscus. Consider outpatient orthopedic follow-up.    12 Lead ECG (05.18.18 @ 18:41):  Normal sinus rhythm CBC Full  -  ( 22 May 2018 21:36 )  WBC Count : 7.71 K/uL  Hemoglobin : 14.7 g/dL  Hematocrit : 43.4 %  Platelet Count - Automated : 277 K/uL  Mean Cell Volume : 84.9 fL  Mean Cell Hemoglobin : 28.8 pg  Mean Cell Hemoglobin Concentration : 33.9 g/dL    BMP: 05-22-18 @ 21:36  141 | 100 | 10   -----------------< 96  4.2  | 25 | 0.7  eGFR(AA): 143, eGFR (non-AA): 123  Ca 9.6, Mg --, P --    LFTs: 05-22-18 @ 21:36  TP  7.1  | 4.4 Alb   ------------------  TB <0.2 | --  DB   ------------------  ALT 9   | 16  AST            ^         96 ALK    Urinalysis (05.22.18 @ 21:45)    Glucose Qualitative, Urine: Negative mg/dL    Blood, Urine: Large    pH Urine: 6.0    Color: Yellow    Urine Appearance: Cloudy    Bilirubin: Negative    Ketone - Urine: Negative    Specific Gravity: 1.020    Protein, Urine: Trace mg/dL    Urobilinogen: 1.0 mg/dL    Nitrite: Negative    Leukocyte Esterase Concentration: Trace  Urine Microscopic-Add On (NC) (05.22.18 @ 21:45)    White Blood Cell - Urine: 3-5 /HPF    Epithelial Cells: Few /HPF    Bacteria: Few /HPF    X-ray Knee 3 Views, Bilateral (05.18.18 @ 17:15):  No acute fracture or joint malalignment or suprapatellar joint effusions. No radiopaque foreign body  Joint spaces demonstrate mild prominence of bilateral lateral compartments which may suggest the presence of an underlying discoid meniscus. Consider outpatient orthopedic follow-up.    12 Lead ECG (05.18.18 @ 18:41):  Normal sinus rhythm

## 2018-05-22 NOTE — ED PEDIATRIC TRIAGE NOTE - CHIEF COMPLAINT QUOTE
"I stepped on a pothole and fell a month ago. My back has been hurting since I fell and I can't walk."

## 2018-05-22 NOTE — CONSULT NOTE ADULT - SUBJECTIVE AND OBJECTIVE BOX
CC: Lower extremity pain and weakness    HPI: 24 yo right handed female with h/o anxiety , chronic back pain and frequent falls since age 14 and a fall 1 month ago with injury to the knees followed by progressively worsening lower extremity weakness and pain and inability to ambulate. Patient states that 1 month ago she fell in a pothole, fell on her knees came to ER few times but refused work up. Now since past few weeks she has severe low back pain shooting down her legs due to which she is unable to walk and is wheelchair bound. Denies bowel bladder incontinence, numbness /paresthesias.    Home medications  patient does not remember the name of the medication she takes at home    Social history  -1-2 cigarettes per day x 8 yrs  -denies alcohol and drug abuse    Family history  -Denies significant family history       Neuro Exam:  Orientation: oriented to person, place time and situation, emotionally very labile  Cranial Nerves: visual acuity intact bilaterally, visual fields full to confrontation, pupils equal round and reactive to light, extraocular motion intact, facial sensation intact symmetrically, face symmetrical, hearing was intact bilaterally, tongue and palate midline, head turning and shoulder shrug symmetric and there was no tongue deviation with protrusion.   Motor: Motor exam is pain limited              ue 4+/5 proximal and distal             le  3/5 bilaterally             on general inspection there is no atrophy ,abnormal involuntary movements  Sensory exam: intact to pp temp, vibration and proprioception  Coordination:. No dysmetria , unable to test limb ataxia  Deep tendon reflexes: 1+/4 ue                                   brisk le b/lly    gait: wheel chair bound    Allergies    No Known Allergies    Intolerances      MEDICATIONS  (STANDING):    MEDICATIONS  (PRN):  acetaminophen   Tablet. 650 milliGRAM(s) Oral once PRN Moderate Pain (4 - 6)      LABS:                        14.7   7.71  )-----------( 277      ( 22 May 2018 21:36 )             43.4           Neuro Imaging:        EEG:     Echo:   Carotid Doppler: N/A  Telemetry:

## 2018-05-23 VITALS
DIASTOLIC BLOOD PRESSURE: 64 MMHG | RESPIRATION RATE: 18 BRPM | OXYGEN SATURATION: 98 % | SYSTOLIC BLOOD PRESSURE: 118 MMHG | TEMPERATURE: 97 F | HEART RATE: 76 BPM

## 2018-05-23 RX ORDER — AMOXICILLIN 250 MG/5ML
1 SUSPENSION, RECONSTITUTED, ORAL (ML) ORAL
Qty: 0 | Refills: 0 | COMMUNITY

## 2018-05-23 RX ORDER — OLANZAPINE 15 MG/1
1 TABLET, FILM COATED ORAL
Qty: 0 | Refills: 0 | COMMUNITY

## 2018-05-23 RX ORDER — OLANZAPINE 15 MG/1
5 TABLET, FILM COATED ORAL DAILY
Qty: 0 | Refills: 0 | Status: DISCONTINUED | OUTPATIENT
Start: 2018-05-23 | End: 2018-05-23

## 2018-05-23 RX ADMIN — METHOCARBAMOL 1000 MILLIGRAM(S): 500 TABLET, FILM COATED ORAL at 00:27

## 2018-05-23 RX ADMIN — Medication 650 MILLIGRAM(S): at 00:27

## 2018-05-23 NOTE — CHART NOTE - NSCHARTNOTEFT_GEN_A_CORE
Called by RN that patient eloped from the hospital. After agreeing to be admitted, she was moved into a room in the ER and placed on a stretcher. Her fiance was also in the ED being admitted, but he was sitting in a chair in the hallway.  The patient became upset that her fiance could not be placed in the stretcher next to her in the room. She argued with the staff about this issue and then decided she was not going to stay. She then eloped before any diagnostic testing could be performed or treatment could be given. She did not wait for a MD to speak to her, provide discharge instructions, or follow-up appointments.    Michael Goldberg, MD  PGY-2 Internal Medicine.

## 2018-05-23 NOTE — ED ADULT NURSE REASSESSMENT NOTE - NS ED NURSE REASSESS COMMENT FT1
Pt states she does not want to be here, she wants to go home. Informed pt of need to inform MD and signing AMA form. Pt states she is not waiting for MD, she is not signing form, she wants to home home now.

## 2018-05-25 DIAGNOSIS — R26.2 DIFFICULTY IN WALKING, NOT ELSEWHERE CLASSIFIED: ICD-10-CM

## 2018-05-25 DIAGNOSIS — R29.6 REPEATED FALLS: ICD-10-CM

## 2018-05-25 DIAGNOSIS — G89.29 OTHER CHRONIC PAIN: ICD-10-CM

## 2018-05-25 DIAGNOSIS — M54.9 DORSALGIA, UNSPECIFIED: ICD-10-CM

## 2018-05-25 DIAGNOSIS — F41.9 ANXIETY DISORDER, UNSPECIFIED: ICD-10-CM

## 2018-05-25 DIAGNOSIS — M25.561 PAIN IN RIGHT KNEE: ICD-10-CM

## 2018-05-25 DIAGNOSIS — M25.562 PAIN IN LEFT KNEE: ICD-10-CM

## 2018-07-09 ENCOUNTER — EMERGENCY (EMERGENCY)
Facility: HOSPITAL | Age: 23
LOS: 0 days | Discharge: HOME | End: 2018-07-09
Attending: STUDENT IN AN ORGANIZED HEALTH CARE EDUCATION/TRAINING PROGRAM | Admitting: STUDENT IN AN ORGANIZED HEALTH CARE EDUCATION/TRAINING PROGRAM

## 2018-07-09 VITALS
TEMPERATURE: 98 F | HEART RATE: 78 BPM | SYSTOLIC BLOOD PRESSURE: 140 MMHG | DIASTOLIC BLOOD PRESSURE: 78 MMHG | OXYGEN SATURATION: 98 % | RESPIRATION RATE: 20 BRPM

## 2018-07-09 DIAGNOSIS — J45.909 UNSPECIFIED ASTHMA, UNCOMPLICATED: ICD-10-CM

## 2018-07-09 DIAGNOSIS — F17.210 NICOTINE DEPENDENCE, CIGARETTES, UNCOMPLICATED: ICD-10-CM

## 2018-07-09 DIAGNOSIS — Z79.2 LONG TERM (CURRENT) USE OF ANTIBIOTICS: ICD-10-CM

## 2018-07-09 DIAGNOSIS — Z79.899 OTHER LONG TERM (CURRENT) DRUG THERAPY: ICD-10-CM

## 2018-07-09 DIAGNOSIS — R06.02 SHORTNESS OF BREATH: ICD-10-CM

## 2018-07-09 DIAGNOSIS — F41.9 ANXIETY DISORDER, UNSPECIFIED: ICD-10-CM

## 2018-07-09 PROBLEM — A69.20 LYME DISEASE, UNSPECIFIED: Chronic | Status: ACTIVE | Noted: 2018-05-22

## 2018-07-09 RX ORDER — DEXAMETHASONE 0.5 MG/5ML
10 ELIXIR ORAL ONCE
Qty: 0 | Refills: 0 | Status: DISCONTINUED | OUTPATIENT
Start: 2018-07-09 | End: 2018-07-09

## 2018-07-09 RX ORDER — IPRATROPIUM/ALBUTEROL SULFATE 18-103MCG
3 AEROSOL WITH ADAPTER (GRAM) INHALATION ONCE
Qty: 0 | Refills: 0 | Status: COMPLETED | OUTPATIENT
Start: 2018-07-09 | End: 2018-07-09

## 2018-07-09 RX ORDER — DEXAMETHASONE 0.5 MG/5ML
10 ELIXIR ORAL ONCE
Qty: 0 | Refills: 0 | Status: COMPLETED | OUTPATIENT
Start: 2018-07-09 | End: 2018-07-09

## 2018-07-09 RX ADMIN — Medication 10 MILLIGRAM(S): at 16:53

## 2018-07-09 RX ADMIN — Medication 3 MILLILITER(S): at 16:53

## 2018-07-09 NOTE — ED PROVIDER NOTE - OBJECTIVE STATEMENT
24 y/o F pmh asthma visiting family in ED went outside to smoke a cigarette, upon returning became SOB and had wheezing, hoarse voice and CP. Family called ED staff to bedside for assistance.

## 2018-07-09 NOTE — ED PROVIDER NOTE - PROGRESS NOTE DETAILS
pt speaking normally, appears better, refusing IV, ambulating steadily to bathroom without oxygen or nebulizer. pt refusing IM decadron, wants PO lungs CTAB good airflow and no wheezes pt feeling better, no longer using duoneb, states she is hungry. hoarseness 100% resolved.

## 2018-07-09 NOTE — ED PROVIDER NOTE - ATTENDING CONTRIBUTION TO CARE
22 y/o F pmh asthma, p/w wheezing, c/w prior asthma exacerbations.  Felt some sx x past few days, worse w/ smoking today.  NO fever, leg sweling, cp.  + wheezing on exam.  felt better w/ neb, steroid.  Pt stable for dc w/ PMD f/up, and care as discussed.  Pt/ family understands plan and signs and symptoms for ED return.

## 2018-07-09 NOTE — ED PROVIDER NOTE - PHYSICAL EXAMINATION
AOx4, anxious appearing and tearful. Skin  warm and dry, no acute rash. Head normocephalic, atraumatic. Conjunctiva and sclera clear. MM moist, no nasal discharge.  Pharynx unremarkable.  Neck supple nt, no meningeal signs. Heart tachy, regular s1s2 nl, no rub/murmur. Lungs- No retractions, BS equal, diffuse wheezing and transmitted upper airway sounds. Abdomen soft ntnd no r/g. Extremities- moves all, no LE edema, calves nttp b/l.

## 2018-07-13 ENCOUNTER — EMERGENCY (EMERGENCY)
Facility: HOSPITAL | Age: 23
LOS: 0 days | Discharge: HOME | End: 2018-07-13
Admitting: PHYSICIAN ASSISTANT

## 2018-07-13 VITALS
RESPIRATION RATE: 18 BRPM | HEART RATE: 99 BPM | TEMPERATURE: 98 F | SYSTOLIC BLOOD PRESSURE: 110 MMHG | OXYGEN SATURATION: 99 % | DIASTOLIC BLOOD PRESSURE: 70 MMHG

## 2018-07-13 DIAGNOSIS — S91.214A LACERATION WITHOUT FOREIGN BODY OF RIGHT LESSER TOE(S) WITH DAMAGE TO NAIL, INITIAL ENCOUNTER: ICD-10-CM
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
## 2018-07-13 DIAGNOSIS — Z79.899 OTHER LONG TERM (CURRENT) DRUG THERAPY: ICD-10-CM

## 2018-07-13 DIAGNOSIS — M79.673 PAIN IN UNSPECIFIED FOOT: ICD-10-CM

## 2018-07-13 DIAGNOSIS — S92.501A DISPLACED UNSPECIFIED FRACTURE OF RIGHT LESSER TOE(S), INITIAL ENCOUNTER FOR CLOSED FRACTURE: ICD-10-CM
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
## 2018-07-13 RX ORDER — IBUPROFEN 200 MG
600 TABLET ORAL ONCE
Qty: 0 | Refills: 0 | Status: COMPLETED | OUTPATIENT
Start: 2018-07-13 | End: 2018-07-13

## 2018-07-13 RX ADMIN — Medication 600 MILLIGRAM(S): at 11:45

## 2018-07-13 NOTE — ED PROVIDER NOTE - PHYSICAL EXAMINATION
CONSTITUTIONAL: Well-appearing; well-nourished; in no apparent distress.    CARDIOVASCULAR: Normal S1, S2; no murmurs, rubs, or gallops.   RESPIRATORY: Normal chest excursion with respiration; breath sounds clear and equal bilaterally; no wheezes, rhonchi, or rales.  MS: + minor swelling and tenderness over 5th phalange of right foot. Full ROM of toes/foot/ankle. No malleolar, midfoot, 5th metatarsal tenderness.   Pedal pulses intact.   SKIN: + small laceration over base of nail of 5th right toe.   NEURO/PSYCH: A & O x 4; Sensation intact. CONSTITUTIONAL: Well-appearing; well-nourished; in no apparent distress.    CARDIOVASCULAR: Normal S1, S2; no murmurs, rubs, or gallops.   RESPIRATORY: Normal chest excursion with respiration; breath sounds clear and equal bilaterally; no wheezes, rhonchi, or rales.  MS: + minor swelling and tenderness over 5th phalange of right foot. Full ROM of toes/foot/ankle. No malleolar, midfoot, 5th metatarsal tenderness.   Pedal pulses intact.   SKIN: + small laceration over base of nail of 5th right toe. No ecchymosis noted.   NEURO/PSYCH: A & O x 4; Sensation intact. CONSTITUTIONAL: Well-appearing; well-nourished; in no apparent distress.    CARDIOVASCULAR: Normal S1, S2; no murmurs, rubs, or gallops.   RESPIRATORY: Normal chest excursion with respiration; breath sounds clear and equal bilaterally; no wheezes, rhonchi, or rales.  MS: + minor swelling and tenderness over 5th phalange of right foot. Full ROM of toes/foot/ankle. No malleolar, midfoot, 5th metatarsal tenderness.   Pedal pulses intact.   SKIN: + small laceration less than one cm superficial over base of nail of 5th right toe. No ecchymosis noted.   NEURO/PSYCH: A & O x 4; Sensation intact.

## 2018-07-13 NOTE — ED PROVIDER NOTE - OBJECTIVE STATEMENT
23 year old female no pmhx c/o pain and swelling over her 5th phalange of her right foot. Pt was walking at home when she stubbed her toe against her mom's hospital bed. Pt was immediately able to bare weight on her foot. + cut over base of nail. No decreased sensation, paresthesias, ankle pain.

## 2018-07-13 NOTE — ED PROVIDER NOTE - NS ED ROS FT
Constitutional: no fever, chills, no recent weight loss, change in appetite or malaise  Cardiac: No chest pain, SOB or edema.  Respiratory: No cough or respiratory distress  MS: + pain/swelling over 5th phalange of right foot  Neuro: No decreased sensation/paresthesias  Skin: +small laceration on 5th phalnge of right foot  Except as documented in the HPI, all other systems are negative. Constitutional: no fever, chills, no recent weight loss, change in appetite or malaise  Cardiac: No chest pain, SOB or edema.  Respiratory: No cough or respiratory distress  MS: + pain/swelling over 5th phalange of right foot  Neuro: No decreased sensation/paresthesias  Skin: +small laceration on 5th phalange of right foot  Except as documented in the HPI, all other systems are negative.

## 2018-07-13 NOTE — ED PROVIDER NOTE - PROGRESS NOTE DETAILS
patient seen by tristan finn under direct supervision of tristan coleman patient kajal tape of toe follow up as an outpatient discussed

## 2018-07-15 ENCOUNTER — EMERGENCY (EMERGENCY)
Facility: HOSPITAL | Age: 23
LOS: 0 days | Discharge: LEFT BEFORE DISCHARGE | End: 2018-07-15
Admitting: PHYSICIAN ASSISTANT

## 2018-07-15 VITALS
OXYGEN SATURATION: 99 % | SYSTOLIC BLOOD PRESSURE: 133 MMHG | DIASTOLIC BLOOD PRESSURE: 66 MMHG | HEART RATE: 104 BPM | RESPIRATION RATE: 18 BRPM | TEMPERATURE: 97 F

## 2018-07-15 DIAGNOSIS — S92.501D: ICD-10-CM
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
## 2018-07-15 DIAGNOSIS — M79.674 PAIN IN RIGHT TOE(S): ICD-10-CM

## 2018-07-15 DIAGNOSIS — M79.673 PAIN IN UNSPECIFIED FOOT: ICD-10-CM
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
## 2018-07-15 DIAGNOSIS — F41.9 ANXIETY DISORDER, UNSPECIFIED: ICD-10-CM

## 2018-07-15 DIAGNOSIS — M25.512 PAIN IN LEFT SHOULDER: ICD-10-CM

## 2018-07-15 DIAGNOSIS — J45.909 UNSPECIFIED ASTHMA, UNCOMPLICATED: ICD-10-CM

## 2018-07-15 DIAGNOSIS — Z79.2 LONG TERM (CURRENT) USE OF ANTIBIOTICS: ICD-10-CM
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
## 2018-07-15 DIAGNOSIS — Z79.899 OTHER LONG TERM (CURRENT) DRUG THERAPY: ICD-10-CM

## 2018-07-15 NOTE — ED PROVIDER NOTE - OBJECTIVE STATEMENT
pt dx with 5th toe right foot fx 2 days ago, had removed kajal tape and sts sister stepped on her foot, hurting  also sts today her large dog jumped onto her, hitting her left shoulder  requesting percocet  no other injuries or complaints

## 2018-07-22 ENCOUNTER — EMERGENCY (EMERGENCY)
Facility: HOSPITAL | Age: 23
LOS: 0 days | Discharge: LEFT BEFORE DISCHARGE | End: 2018-07-22
Attending: EMERGENCY MEDICINE | Admitting: EMERGENCY MEDICINE

## 2018-07-22 VITALS
HEART RATE: 122 BPM | SYSTOLIC BLOOD PRESSURE: 128 MMHG | OXYGEN SATURATION: 100 % | RESPIRATION RATE: 24 BRPM | DIASTOLIC BLOOD PRESSURE: 76 MMHG | TEMPERATURE: 98 F

## 2018-07-22 VITALS
RESPIRATION RATE: 20 BRPM | DIASTOLIC BLOOD PRESSURE: 76 MMHG | OXYGEN SATURATION: 100 % | SYSTOLIC BLOOD PRESSURE: 134 MMHG

## 2018-07-22 DIAGNOSIS — R06.1 STRIDOR: ICD-10-CM

## 2018-07-22 DIAGNOSIS — J45.909 UNSPECIFIED ASTHMA, UNCOMPLICATED: ICD-10-CM

## 2018-07-22 DIAGNOSIS — Z79.899 OTHER LONG TERM (CURRENT) DRUG THERAPY: ICD-10-CM

## 2018-07-22 DIAGNOSIS — R06.2 WHEEZING: ICD-10-CM

## 2018-07-22 DIAGNOSIS — Z79.2 LONG TERM (CURRENT) USE OF ANTIBIOTICS: ICD-10-CM

## 2018-07-22 LAB
ALBUMIN SERPL ELPH-MCNC: 4.2 G/DL — SIGNIFICANT CHANGE UP (ref 3.5–5.2)
ALP SERPL-CCNC: 77 U/L — SIGNIFICANT CHANGE UP (ref 30–115)
ALT FLD-CCNC: 28 U/L — SIGNIFICANT CHANGE UP (ref 0–41)
ANION GAP SERPL CALC-SCNC: 18 MMOL/L — HIGH (ref 7–14)
AST SERPL-CCNC: 34 U/L — SIGNIFICANT CHANGE UP (ref 0–41)
BASE EXCESS BLDV CALC-SCNC: 1.7 MMOL/L — SIGNIFICANT CHANGE UP (ref -2–2)
BASOPHILS # BLD AUTO: 0.02 K/UL — SIGNIFICANT CHANGE UP (ref 0–0.2)
BASOPHILS NFR BLD AUTO: 0.4 % — SIGNIFICANT CHANGE UP (ref 0–1)
BILIRUB SERPL-MCNC: <0.2 MG/DL — SIGNIFICANT CHANGE UP (ref 0.2–1.2)
BUN SERPL-MCNC: 8 MG/DL — LOW (ref 10–20)
CA-I SERPL-SCNC: 1.21 MMOL/L — SIGNIFICANT CHANGE UP (ref 1.12–1.3)
CALCIUM SERPL-MCNC: 9.3 MG/DL — SIGNIFICANT CHANGE UP (ref 8.5–10.1)
CHLORIDE SERPL-SCNC: 102 MMOL/L — SIGNIFICANT CHANGE UP (ref 98–110)
CO2 SERPL-SCNC: 24 MMOL/L — SIGNIFICANT CHANGE UP (ref 17–32)
CREAT SERPL-MCNC: 0.8 MG/DL — SIGNIFICANT CHANGE UP (ref 0.7–1.5)
EOSINOPHIL # BLD AUTO: 0.14 K/UL — SIGNIFICANT CHANGE UP (ref 0–0.7)
EOSINOPHIL NFR BLD AUTO: 2.9 % — SIGNIFICANT CHANGE UP (ref 0–8)
GAS PNL BLDV: 143 MMOL/L — SIGNIFICANT CHANGE UP (ref 136–145)
GAS PNL BLDV: SIGNIFICANT CHANGE UP
GLUCOSE SERPL-MCNC: 117 MG/DL — HIGH (ref 70–99)
HCO3 BLDV-SCNC: 29 MMOL/L — SIGNIFICANT CHANGE UP (ref 22–29)
HCT VFR BLD CALC: 40.2 % — SIGNIFICANT CHANGE UP (ref 37–47)
HCT VFR BLDA CALC: 44.2 % — HIGH (ref 34–44)
HGB BLD CALC-MCNC: 14.4 G/DL — SIGNIFICANT CHANGE UP (ref 14–18)
HGB BLD-MCNC: 13.4 G/DL — SIGNIFICANT CHANGE UP (ref 12–16)
IMM GRANULOCYTES NFR BLD AUTO: 0.2 % — SIGNIFICANT CHANGE UP (ref 0.1–0.3)
LACTATE BLDV-MCNC: 3.1 MMOL/L — HIGH (ref 0.5–1.6)
LYMPHOCYTES # BLD AUTO: 2.18 K/UL — SIGNIFICANT CHANGE UP (ref 1.2–3.4)
LYMPHOCYTES # BLD AUTO: 44.9 % — SIGNIFICANT CHANGE UP (ref 20.5–51.1)
MAGNESIUM SERPL-MCNC: 1.6 MG/DL — LOW (ref 1.8–2.4)
MCHC RBC-ENTMCNC: 28.6 PG — SIGNIFICANT CHANGE UP (ref 27–31)
MCHC RBC-ENTMCNC: 33.3 G/DL — SIGNIFICANT CHANGE UP (ref 32–37)
MCV RBC AUTO: 85.9 FL — SIGNIFICANT CHANGE UP (ref 81–99)
MONOCYTES # BLD AUTO: 0.36 K/UL — SIGNIFICANT CHANGE UP (ref 0.1–0.6)
MONOCYTES NFR BLD AUTO: 7.4 % — SIGNIFICANT CHANGE UP (ref 1.7–9.3)
NEUTROPHILS # BLD AUTO: 2.14 K/UL — SIGNIFICANT CHANGE UP (ref 1.4–6.5)
NEUTROPHILS NFR BLD AUTO: 44.2 % — SIGNIFICANT CHANGE UP (ref 42.2–75.2)
NRBC # BLD: 0 /100 WBCS — SIGNIFICANT CHANGE UP (ref 0–0)
PCO2 BLDV: 55 MMHG — HIGH (ref 41–51)
PH BLDV: 7.33 — SIGNIFICANT CHANGE UP (ref 7.26–7.43)
PLATELET # BLD AUTO: 200 K/UL — SIGNIFICANT CHANGE UP (ref 130–400)
PO2 BLDV: 23 MMHG — SIGNIFICANT CHANGE UP (ref 20–40)
POTASSIUM BLDV-SCNC: 2.8 MMOL/L — LOW (ref 3.3–5.6)
POTASSIUM SERPL-MCNC: 3 MMOL/L — LOW (ref 3.5–5)
POTASSIUM SERPL-SCNC: 3 MMOL/L — LOW (ref 3.5–5)
PROT SERPL-MCNC: 6.5 G/DL — SIGNIFICANT CHANGE UP (ref 6–8)
RBC # BLD: 4.68 M/UL — SIGNIFICANT CHANGE UP (ref 4.2–5.4)
RBC # FLD: 13.2 % — SIGNIFICANT CHANGE UP (ref 11.5–14.5)
SAO2 % BLDV: 40 % — SIGNIFICANT CHANGE UP
SODIUM SERPL-SCNC: 144 MMOL/L — SIGNIFICANT CHANGE UP (ref 135–146)
WBC # BLD: 4.85 K/UL — SIGNIFICANT CHANGE UP (ref 4.8–10.8)
WBC # FLD AUTO: 4.85 K/UL — SIGNIFICANT CHANGE UP (ref 4.8–10.8)

## 2018-07-22 RX ORDER — MAGNESIUM SULFATE 500 MG/ML
2 VIAL (ML) INJECTION ONCE
Qty: 0 | Refills: 0 | Status: COMPLETED | OUTPATIENT
Start: 2018-07-22 | End: 2018-07-22

## 2018-07-22 RX ORDER — MAGNESIUM SULFATE 500 MG/ML
1 VIAL (ML) INJECTION ONCE
Qty: 0 | Refills: 0 | Status: DISCONTINUED | OUTPATIENT
Start: 2018-07-22 | End: 2018-07-22

## 2018-07-22 RX ORDER — EPINEPHRINE 11.25MG/ML
0.5 SOLUTION, NON-ORAL INHALATION ONCE
Qty: 0 | Refills: 0 | Status: COMPLETED | OUTPATIENT
Start: 2018-07-22 | End: 2018-07-22

## 2018-07-22 RX ORDER — IPRATROPIUM/ALBUTEROL SULFATE 18-103MCG
3 AEROSOL WITH ADAPTER (GRAM) INHALATION ONCE
Qty: 0 | Refills: 0 | Status: COMPLETED | OUTPATIENT
Start: 2018-07-22 | End: 2018-07-22

## 2018-07-22 RX ADMIN — Medication 0.5 MILLILITER(S): at 17:30

## 2018-07-22 RX ADMIN — Medication 50 GRAM(S): at 17:05

## 2018-07-22 RX ADMIN — Medication 3 MILLILITER(S): at 17:30

## 2018-07-22 NOTE — ED PROVIDER NOTE - ATTENDING CONTRIBUTION TO CARE
hx of asthma, now with stridor. given nebs by ems and steroids. no URI symptoms.   on exam, full sentences, + inspiratory stridor noted, pink conj, anicteric, MMM, neck supple, CTAB, no wheezing on my exam, tachy, RRR, equal radial pulses bilat, abd soft/nt/nd, no cva tend. no calves tend, no edema, no fnd. no rashes.  racemic epi, steroids already given, reassess.

## 2018-07-22 NOTE — ED PROVIDER NOTE - NS ED ROS FT
ROS: No fever/chills, No headache/photophobia/eye pain/changes in vision, No ear pain/sore throat/dysphagia, No chest pain/palpitations, no /cough/wheeze/stridor, No abdominal pain, No N/V/D/melena, no dysuria/frequency/discharge, No neck/back pain, no rash, no changes in neurological status/function.    +sob

## 2018-07-22 NOTE — ED PROVIDER NOTE - MEDICAL DECISION MAKING DETAILS
left ama, did not want to wait to be observed. The patient wishes to leave against medical advice.  I have discussed the risks, benefits and alternatives (including the possibility of worsening of disease, pain, permanent disability, and/or death) with the patient and her fiance.  The patient voices understanding of these risks, benefits, and alternatives and still wishes to sign out against medical advice.  The patient is awake, alert, oriented  x 3 and has demonstrated capacity to refuse/direct care.  I have advised the patient that they can and should return immediately should they develop any worse/different/additional symptoms, or if they change their mind and want to continue their care.

## 2018-07-22 NOTE — ED PROVIDER NOTE - PROGRESS NOTE DETAILS
Patient is awake/alert/interactive with normal mental status and normal neurologic function. Patient reports no SI/HI and demonstrates normal thought processes with no evidence of intoxication, delirium, delusions or hallucinations. Patient requesting to leave against medical advice at this time. Advised patient of potential risks of leaving AMA which include potential disability or death. Attempted to convince patient to stay and continue work up/treatment and patient refused. Patient has capacity to make medical decisions at this time and will be signed out AMA. Patient instructed to follow up with his primary care provider as an outpatient and is aware they can return to the emergency department at any time for evaluation. pt reports improvement with racemic epinephrine

## 2018-07-22 NOTE — ED PROVIDER NOTE - OBJECTIVE STATEMENT
24y/o F w/ hx of asthma (never been intubated) presents for wheezing episode started 3pm.  pt has been trying albuterol at home.  ems gave solumedrol 125mg, 2 duonebs.  no cough, congestion, runny nose. no fevers.  recent toe fracture on boot, pt denies inability to walk or immobilization.  denies smoking. 24y/o F w/ hx of asthma (never been intubated) presents for wheezing episode started 3pm.  pt has been trying albuterol at home.  ems gave solumedrol 125mg, 2 duonebs.  no cough, congestion, runny nose. no fevers.  recent toe fracture on boot, pt denies inability to walk or immobilization.  +smoking.  no recent URI's

## 2018-07-22 NOTE — ED PROVIDER NOTE - PHYSICAL EXAMINATION
VITAL SIGNS: I have reviewed nursing notes and confirm.  CONSTITUTIONAL: Well-developed; well-nourished; in no acute distress. pt comfortable.  SKIN: skin exam is warm and dry, no acute rash.   HEAD: Normocephalic; atraumatic.  EYES:  EOM intact; conjunctiva and sclera clear.  ENT: No nasal discharge; airway clear. moist oral mucosa; uvula at midline. no pharyngeal erythema, edema exudate or vesicles.    NECK: Supple; non tender.  CARD: S1, S2 normal; no murmurs, gallops, or rubs. Regular rate and rhythm. posterior tibial and radial pulses 2+  RESP: +wheezing + inspiratory stridor.  cta b/l  ABD: Normal bowel sounds; soft; non-distended; non-tender; no rebound.   EXT: Normal ROM. No  cyanosis or edema.  BACK: No cva tenderness  LYMPH: No acute cervical adenopathy.  NEURO: Alert, oriented, grossly unremarkable.    PSYCH: Cooperative, appropriate.

## 2018-07-23 ENCOUNTER — EMERGENCY (EMERGENCY)
Facility: HOSPITAL | Age: 23
LOS: 0 days | Discharge: HOME | End: 2018-07-23
Admitting: PHYSICIAN ASSISTANT

## 2018-07-23 VITALS
RESPIRATION RATE: 20 BRPM | OXYGEN SATURATION: 100 % | DIASTOLIC BLOOD PRESSURE: 61 MMHG | SYSTOLIC BLOOD PRESSURE: 110 MMHG | HEART RATE: 89 BPM | TEMPERATURE: 99 F

## 2018-07-23 VITALS
RESPIRATION RATE: 117 BRPM | TEMPERATURE: 99 F | DIASTOLIC BLOOD PRESSURE: 55 MMHG | OXYGEN SATURATION: 99 % | HEART RATE: 117 BPM | WEIGHT: 130.07 LBS | SYSTOLIC BLOOD PRESSURE: 100 MMHG

## 2018-07-23 DIAGNOSIS — M79.662 PAIN IN LEFT LOWER LEG: ICD-10-CM

## 2018-07-23 DIAGNOSIS — Z79.899 OTHER LONG TERM (CURRENT) DRUG THERAPY: ICD-10-CM

## 2018-07-23 DIAGNOSIS — J45.909 UNSPECIFIED ASTHMA, UNCOMPLICATED: ICD-10-CM

## 2018-07-23 DIAGNOSIS — F17.200 NICOTINE DEPENDENCE, UNSPECIFIED, UNCOMPLICATED: ICD-10-CM

## 2018-07-23 DIAGNOSIS — J45.901 UNSPECIFIED ASTHMA WITH (ACUTE) EXACERBATION: ICD-10-CM

## 2018-07-23 DIAGNOSIS — Z79.2 LONG TERM (CURRENT) USE OF ANTIBIOTICS: ICD-10-CM

## 2018-07-23 PROBLEM — F41.9 ANXIETY DISORDER, UNSPECIFIED: Chronic | Status: ACTIVE | Noted: 2018-03-12

## 2018-07-23 LAB
ANION GAP SERPL CALC-SCNC: 16 MMOL/L — HIGH (ref 7–14)
BASE EXCESS BLDV CALC-SCNC: 0.6 MMOL/L — SIGNIFICANT CHANGE UP (ref -2–2)
BASOPHILS # BLD AUTO: 0.02 K/UL — SIGNIFICANT CHANGE UP (ref 0–0.2)
BASOPHILS NFR BLD AUTO: 0.1 % — SIGNIFICANT CHANGE UP (ref 0–1)
BUN SERPL-MCNC: 11 MG/DL — SIGNIFICANT CHANGE UP (ref 10–20)
CALCIUM SERPL-MCNC: 9.8 MG/DL — SIGNIFICANT CHANGE UP (ref 8.5–10.1)
CHLORIDE SERPL-SCNC: 105 MMOL/L — SIGNIFICANT CHANGE UP (ref 98–110)
CO2 SERPL-SCNC: 23 MMOL/L — SIGNIFICANT CHANGE UP (ref 17–32)
CREAT SERPL-MCNC: 0.6 MG/DL — LOW (ref 0.7–1.5)
D DIMER BLD IA.RAPID-MCNC: 117 NG/ML DDU — SIGNIFICANT CHANGE UP (ref 0–230)
EOSINOPHIL # BLD AUTO: 0 K/UL — SIGNIFICANT CHANGE UP (ref 0–0.7)
EOSINOPHIL NFR BLD AUTO: 0 % — SIGNIFICANT CHANGE UP (ref 0–8)
GLUCOSE SERPL-MCNC: 106 MG/DL — HIGH (ref 70–99)
HCO3 BLDV-SCNC: 25 MMOL/L — SIGNIFICANT CHANGE UP (ref 22–29)
HCT VFR BLD CALC: 37.9 % — SIGNIFICANT CHANGE UP (ref 37–47)
HGB BLD-MCNC: 12.9 G/DL — SIGNIFICANT CHANGE UP (ref 12–16)
IMM GRANULOCYTES NFR BLD AUTO: 0.7 % — HIGH (ref 0.1–0.3)
LACTATE BLDV-MCNC: 2.8 MMOL/L — HIGH (ref 0.5–1.6)
LYMPHOCYTES # BLD AUTO: 0.95 K/UL — LOW (ref 1.2–3.4)
LYMPHOCYTES # BLD AUTO: 5.5 % — LOW (ref 20.5–51.1)
MCHC RBC-ENTMCNC: 29.1 PG — SIGNIFICANT CHANGE UP (ref 27–31)
MCHC RBC-ENTMCNC: 34 G/DL — SIGNIFICANT CHANGE UP (ref 32–37)
MCV RBC AUTO: 85.4 FL — SIGNIFICANT CHANGE UP (ref 81–99)
MONOCYTES # BLD AUTO: 0.65 K/UL — HIGH (ref 0.1–0.6)
MONOCYTES NFR BLD AUTO: 3.7 % — SIGNIFICANT CHANGE UP (ref 1.7–9.3)
NEUTROPHILS # BLD AUTO: 15.61 K/UL — HIGH (ref 1.4–6.5)
NEUTROPHILS NFR BLD AUTO: 90 % — HIGH (ref 42.2–75.2)
NRBC # BLD: 0 /100 WBCS — SIGNIFICANT CHANGE UP (ref 0–0)
PCO2 BLDV: 42 MMHG — SIGNIFICANT CHANGE UP (ref 41–51)
PH BLDV: 7.39 — SIGNIFICANT CHANGE UP (ref 7.26–7.43)
PLATELET # BLD AUTO: 233 K/UL — SIGNIFICANT CHANGE UP (ref 130–400)
PO2 BLDV: 27 MMHG — SIGNIFICANT CHANGE UP (ref 20–40)
POTASSIUM SERPL-MCNC: 4.2 MMOL/L — SIGNIFICANT CHANGE UP (ref 3.5–5)
POTASSIUM SERPL-SCNC: 4.2 MMOL/L — SIGNIFICANT CHANGE UP (ref 3.5–5)
RBC # BLD: 4.44 M/UL — SIGNIFICANT CHANGE UP (ref 4.2–5.4)
RBC # FLD: 13.4 % — SIGNIFICANT CHANGE UP (ref 11.5–14.5)
SAO2 % BLDV: 52 % — SIGNIFICANT CHANGE UP
SODIUM SERPL-SCNC: 144 MMOL/L — SIGNIFICANT CHANGE UP (ref 135–146)
WBC # BLD: 17.35 K/UL — HIGH (ref 4.8–10.8)
WBC # FLD AUTO: 17.35 K/UL — HIGH (ref 4.8–10.8)

## 2018-07-23 RX ORDER — SODIUM CHLORIDE 9 MG/ML
500 INJECTION INTRAMUSCULAR; INTRAVENOUS; SUBCUTANEOUS ONCE
Qty: 0 | Refills: 0 | Status: COMPLETED | OUTPATIENT
Start: 2018-07-23 | End: 2018-07-23

## 2018-07-23 RX ORDER — IPRATROPIUM/ALBUTEROL SULFATE 18-103MCG
3 AEROSOL WITH ADAPTER (GRAM) INHALATION EVERY 6 HOURS
Qty: 0 | Refills: 0 | Status: DISCONTINUED | OUTPATIENT
Start: 2018-07-23 | End: 2018-07-23

## 2018-07-23 RX ADMIN — Medication 125 MILLIGRAM(S): at 15:55

## 2018-07-23 RX ADMIN — Medication 3 MILLILITER(S): at 15:55

## 2018-07-23 RX ADMIN — SODIUM CHLORIDE 1000 MILLILITER(S): 9 INJECTION INTRAMUSCULAR; INTRAVENOUS; SUBCUTANEOUS at 16:49

## 2018-07-23 NOTE — ED PROVIDER NOTE - OBJECTIVE STATEMENT
Patient is a 23 years old F pmhx asthma, anxiety disorder presents to the ED for evaluation of shortness of breath that started this morning.  As per patient was seen here yesterday (triaged to critical care area for asthma exacerbation and signed AMA).

## 2018-07-23 NOTE — ED PROVIDER NOTE - PHYSICAL EXAMINATION
Gen: Alert, NAD, well appearing  Head: NC, AT, PERRL, EOMI, normal lids/conjunctiva  ENT: normal hearing, patent oropharynx minimal erythema, no exudate, uvula midline  Neck: +supple, no tenderness/meningismus/JVD, +Trachea midline  Pulm: Bilateral BS, normal resp effort, no wheeze/stridor/retractions  CV: RRR  Abd: soft, NT/ND  Mskel: no edema  Ext: +boot to right foot, +tenderness to right calf  Skin: no rash  Neuro: AAOx3

## 2018-07-23 NOTE — ED PROVIDER NOTE - NS ED ROS FT
Review of Systems  Constitutional: (-) fever  Eyes/ENT: (-) blurry vision, (-) epistaxis  Cardiovascular: (-) chest pain, (-) syncope  Respiratory: (+) cough, (-) shortness of breath  Gastrointestinal: (-) vomiting, (-) diarrhea  Musculoskeletal: (-) neck pain, (-) back pain, (-) joint pain  Integumentary: (-) rash, (-) edema  Neurological: (-) headache

## 2018-07-24 ENCOUNTER — EMERGENCY (EMERGENCY)
Facility: HOSPITAL | Age: 23
LOS: 0 days | Discharge: HOME | End: 2018-07-24
Attending: EMERGENCY MEDICINE | Admitting: EMERGENCY MEDICINE

## 2018-07-24 ENCOUNTER — OUTPATIENT (OUTPATIENT)
Dept: OUTPATIENT SERVICES | Facility: HOSPITAL | Age: 23
LOS: 1 days | Discharge: HOME | End: 2018-07-24

## 2018-07-24 VITALS
SYSTOLIC BLOOD PRESSURE: 109 MMHG | OXYGEN SATURATION: 99 % | RESPIRATION RATE: 18 BRPM | TEMPERATURE: 98 F | DIASTOLIC BLOOD PRESSURE: 51 MMHG | HEART RATE: 65 BPM

## 2018-07-24 DIAGNOSIS — Z79.2 LONG TERM (CURRENT) USE OF ANTIBIOTICS: ICD-10-CM
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
## 2018-07-24 DIAGNOSIS — M25.572 PAIN IN LEFT ANKLE AND JOINTS OF LEFT FOOT: ICD-10-CM
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
## 2018-07-24 DIAGNOSIS — S99.912A UNSPECIFIED INJURY OF LEFT ANKLE, INITIAL ENCOUNTER: ICD-10-CM

## 2018-07-24 DIAGNOSIS — F33.1 MAJOR DEPRESSIVE DISORDER, RECURRENT, MODERATE: ICD-10-CM

## 2018-07-24 DIAGNOSIS — J45.909 UNSPECIFIED ASTHMA, UNCOMPLICATED: ICD-10-CM
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
## 2018-07-24 DIAGNOSIS — Z79.899 OTHER LONG TERM (CURRENT) DRUG THERAPY: ICD-10-CM

## 2018-07-24 RX ORDER — IBUPROFEN 200 MG
600 TABLET ORAL ONCE
Qty: 0 | Refills: 0 | Status: COMPLETED | OUTPATIENT
Start: 2018-07-24 | End: 2018-07-24

## 2018-07-24 RX ADMIN — Medication 600 MILLIGRAM(S): at 01:09

## 2018-07-24 NOTE — ED PROCEDURE NOTE - CPROC ED POST PROC CARE GUIDE1
Keep the cast/splint/dressing clean and dry./Verbal/written post procedure instructions were given to patient/caregiver./Elevate the injured extremity as instructed./Instructed patient/caregiver regarding signs and symptoms of infection./Instructed patient/caregiver to follow-up with primary care physician.

## 2018-07-24 NOTE — ED PROVIDER NOTE - NS ED ROS FT
Review of Systems    Constitutional: (-) fever  Eyes/ENT: (-) change in vision,(-) ear pain  Musculoskeletal: (-) neck pain, (-) back pain,  Integumentary: (-) bruising, (-) lacerations  Neurological: (-) headache

## 2018-07-24 NOTE — ED PROCEDURE NOTE - ATTENDING CONTRIBUTION TO CARE
I was present for and supervised the key/critical aspects of the procedure performed during the care of the patient for ankle/foot pain and subsequent splinting

## 2018-07-24 NOTE — ED PROCEDURE NOTE - NS ED ATTENDING STATEMENT MOD
I have personally performed a face to face diagnostic evaluation on this patient. I have reviewed the ACP note and agree with the history, exam and plan of care, except as noted. not examined

## 2018-07-24 NOTE — ED PROVIDER NOTE - OBJECTIVE STATEMENT
22 yo f pmhx of disequilibrium due to vertigo, R. foot injury, anxiety disorder reports w/ L. ankle pain few hours in duration occurred after trip and fall when transferring from her wheel chair, she was able to catch herself and not fall on ground. Currently c/o L. ankle and L. foot pain. Denies injury to other parts of body, trauma to head.    I have reviewed available current nursing and previous documentation of past medical, surgical, family, and/or social history.

## 2018-07-24 NOTE — ED PROVIDER NOTE - PHYSICAL EXAMINATION
Physical Exam    Vital Signs: I have reviewed the initial vital signs.  Constitutional: well-nourished, appears stated age, no acute distress  Cardiovascular: regular rate, regular rhythm, well-perfused extremities good DP +2  Musculoskeletal: (+) TTP along the late and med malleolus of L. ankle (+) TTP along the foot. supple neck, no lower extremity edema  Integumentary: warm, dry, no rash  Neurologic: awake, alert, extremities’ motor and sensory functions grossly intact

## 2018-07-24 NOTE — ED PROVIDER NOTE - ATTENDING CONTRIBUTION TO CARE
22 yo female presents to er for fall from transferring from her wheel chair, and twisting of left ankle and foot. No other injury. Agree with PA mangement. Xray and supportive care. Follow up with ortho as outpatient.

## 2018-08-06 ENCOUNTER — EMERGENCY (EMERGENCY)
Facility: HOSPITAL | Age: 23
LOS: 0 days | Discharge: HOME | End: 2018-08-06
Attending: EMERGENCY MEDICINE | Admitting: EMERGENCY MEDICINE

## 2018-08-06 VITALS
SYSTOLIC BLOOD PRESSURE: 113 MMHG | HEART RATE: 89 BPM | RESPIRATION RATE: 22 BRPM | OXYGEN SATURATION: 95 % | DIASTOLIC BLOOD PRESSURE: 58 MMHG | TEMPERATURE: 98 F

## 2018-08-06 VITALS
DIASTOLIC BLOOD PRESSURE: 63 MMHG | OXYGEN SATURATION: 98 % | RESPIRATION RATE: 18 BRPM | HEART RATE: 97 BPM | SYSTOLIC BLOOD PRESSURE: 118 MMHG | TEMPERATURE: 98 F

## 2018-08-06 DIAGNOSIS — Z79.52 LONG TERM (CURRENT) USE OF SYSTEMIC STEROIDS: ICD-10-CM

## 2018-08-06 DIAGNOSIS — Z79.2 LONG TERM (CURRENT) USE OF ANTIBIOTICS: ICD-10-CM

## 2018-08-06 DIAGNOSIS — Z79.1 LONG TERM (CURRENT) USE OF NON-STEROIDAL ANTI-INFLAMMATORIES (NSAID): ICD-10-CM

## 2018-08-06 DIAGNOSIS — Z79.899 OTHER LONG TERM (CURRENT) DRUG THERAPY: ICD-10-CM

## 2018-08-06 DIAGNOSIS — J45.901 UNSPECIFIED ASTHMA WITH (ACUTE) EXACERBATION: ICD-10-CM

## 2018-08-06 DIAGNOSIS — J45.21 MILD INTERMITTENT ASTHMA WITH (ACUTE) EXACERBATION: ICD-10-CM

## 2018-08-06 RX ORDER — ALBUTEROL 90 UG/1
2 AEROSOL, METERED ORAL
Qty: 1 | Refills: 0 | OUTPATIENT
Start: 2018-08-06 | End: 2018-09-04

## 2018-08-06 RX ORDER — IPRATROPIUM/ALBUTEROL SULFATE 18-103MCG
3 AEROSOL WITH ADAPTER (GRAM) INHALATION ONCE
Qty: 0 | Refills: 0 | Status: COMPLETED | OUTPATIENT
Start: 2018-08-06 | End: 2018-08-06

## 2018-08-06 RX ADMIN — Medication 3 MILLILITER(S): at 19:22

## 2018-08-06 RX ADMIN — Medication 50 MILLIGRAM(S): at 19:22

## 2018-08-06 NOTE — ED PROVIDER NOTE - NS ED ROS FT
Constitutional:  See HPI.   Eyes:  No visual changes, eye pain or discharge.  ENMT:  No hearing changes, pain, discharge or infections. No neck pain or stiffness.  GI:  No nausea, vomiting, diarrhea, abdominal pain.  :  No dysuria, frequency, hematuria  MS:  No joint pain or back pain.  Neuro:  No LOC. No headache or weakness.    Skin:  No skin rash.

## 2018-08-06 NOTE — ED PROVIDER NOTE - OBJECTIVE STATEMENT
22yo F hx RAD otherwise no sig pmh pw shortness of breath and cough x2d- has been using albuterol inhaler and albuterol nebs at home w minimal relief, last rx 2hrs ago- no f/c/n/v/d, chest pain, abdominal pain, numbness/weakness, No HAMMOND, orthopnea, weight gain, LE edema. No CP, no hx DVT/PE, no LE swelling/pain, no recent travel/trauma. never intubated for asthma, no congestion/rhinorrhea

## 2018-08-06 NOTE — ED PROVIDER NOTE - ATTENDING CONTRIBUTION TO CARE
23yr old female hx of rad, here for eval of nonproductive coug, shortness of breath. pt using albuterol and nebs at home but they now ran out of albuterol

## 2018-08-06 NOTE — ED ADULT NURSE NOTE - OBJECTIVE STATEMENT
Patient reports she has had 2 days of wheezing, congestion and sob. She used once albuterol treatment a home with no relief in symptoms.

## 2018-08-06 NOTE — ED PROVIDER NOTE - PHYSICAL EXAMINATION
Well appearing NAD non toxic. NCAT EOMI conjunctiva nml. No nasal discharge. MMM. Neck supple, non tender, full ROM. RRR no MRG +S1S2. scattered wheezing b/l, normal WOB, no tachypnea, no retractions. Abd s NT ND +BS. Ext WWP x4, moving all extremities, no edema. 2+ equal pulses throughout. Cooperative, appropriate

## 2018-09-26 ENCOUNTER — OUTPATIENT (OUTPATIENT)
Dept: OUTPATIENT SERVICES | Facility: HOSPITAL | Age: 23
LOS: 1 days | Discharge: HOME | End: 2018-09-26

## 2018-09-26 DIAGNOSIS — F33.1 MAJOR DEPRESSIVE DISORDER, RECURRENT, MODERATE: ICD-10-CM

## 2018-10-05 ENCOUNTER — EMERGENCY (EMERGENCY)
Facility: HOSPITAL | Age: 23
LOS: 0 days | Discharge: HOME | End: 2018-10-05
Attending: EMERGENCY MEDICINE | Admitting: EMERGENCY MEDICINE

## 2018-10-05 VITALS
DIASTOLIC BLOOD PRESSURE: 70 MMHG | HEART RATE: 114 BPM | SYSTOLIC BLOOD PRESSURE: 103 MMHG | TEMPERATURE: 98 F | OXYGEN SATURATION: 98 % | RESPIRATION RATE: 20 BRPM

## 2018-10-05 DIAGNOSIS — N89.8 OTHER SPECIFIED NONINFLAMMATORY DISORDERS OF VAGINA: ICD-10-CM

## 2018-10-05 DIAGNOSIS — F17.200 NICOTINE DEPENDENCE, UNSPECIFIED, UNCOMPLICATED: ICD-10-CM

## 2018-10-05 DIAGNOSIS — R05 COUGH: ICD-10-CM

## 2018-10-05 DIAGNOSIS — Z79.899 OTHER LONG TERM (CURRENT) DRUG THERAPY: ICD-10-CM

## 2018-10-05 DIAGNOSIS — R07.9 CHEST PAIN, UNSPECIFIED: ICD-10-CM

## 2018-10-05 DIAGNOSIS — R06.02 SHORTNESS OF BREATH: ICD-10-CM

## 2018-10-05 DIAGNOSIS — R00.0 TACHYCARDIA, UNSPECIFIED: ICD-10-CM

## 2018-10-05 RX ORDER — ALPRAZOLAM 0.25 MG
0.25 TABLET ORAL ONCE
Qty: 0 | Refills: 0 | Status: DISCONTINUED | OUTPATIENT
Start: 2018-10-05 | End: 2018-10-05

## 2018-10-05 RX ORDER — ACETAMINOPHEN 500 MG
650 TABLET ORAL ONCE
Qty: 0 | Refills: 0 | Status: COMPLETED | OUTPATIENT
Start: 2018-10-05 | End: 2018-10-05

## 2018-10-05 RX ORDER — IPRATROPIUM/ALBUTEROL SULFATE 18-103MCG
3 AEROSOL WITH ADAPTER (GRAM) INHALATION ONCE
Qty: 0 | Refills: 0 | Status: COMPLETED | OUTPATIENT
Start: 2018-10-05 | End: 2018-10-05

## 2018-10-05 RX ORDER — DEXAMETHASONE 0.5 MG/5ML
10 ELIXIR ORAL ONCE
Qty: 0 | Refills: 0 | Status: COMPLETED | OUTPATIENT
Start: 2018-10-05 | End: 2018-10-05

## 2018-10-05 RX ADMIN — Medication 650 MILLIGRAM(S): at 13:19

## 2018-10-05 RX ADMIN — Medication 3 MILLILITER(S): at 13:19

## 2018-10-05 NOTE — ED PROVIDER NOTE - MEDICAL DECISION MAKING DETAILS
22yo woman h/o anxiety presented with multiple complaints including subjective fever, chills, sore throat, cough, SOB, hoarse voice x 1  day, body aches, vaginal itch x 2 days. Sexually active, 1 partner. On exam, pt nontoxic appearing but very anxious, hoarse voice with barking cough noted, mild pharyngeal erythema, mildly tender over palpation of the larynx, TMs clear, lungs CTA, no wheeze, normal work of breathing, abd soft. NT. Plan for decadron, pelvic exam, ENT to scope. Sx most likely viral in etiology but given hoarseness, remote possibility of epiglottitis should be considered.

## 2018-10-05 NOTE — ED PROVIDER NOTE - PHYSICAL EXAMINATION
Gen: Alert, NAD, well appearing, +barking cough with stridor  Head: NC, AT, PERRL, EOMI, normal lids/conjunctiva  ENT: normal hearing, patent oropharynx without erythema/exudate  Neck: +supple, no tenderness/meningismus,  Pulm: Bilateral BS, normal resp effort, no wheeze/stridor/retractions  CV: RRR, tachy  Abd: soft, NT/ND, +BS  Mskel: +swelling noted to right calf. no erythema/cyanosis  Skin: no rash, warm/dry  Neuro: AAOx3, no sensory/motor deficits Gen: Alert, NAD, well appearing, +barking cough with stridor  Head: NC, AT, PERRL, EOMI, normal lids/conjunctiva  ENT: normal hearing, patent oropharynx without erythema/exudate  Neck: +supple, no tenderness/meningismus,  Pulm: Bilateral BS, normal resp effort, no wheeze/stridor/retractions  CV: S1S2, tachy  Abd: soft, NT/ND, +BS  Mskel: +swelling and tenderness noted to right calf. no erythema/cyanosis  Skin: no rash, warm/dry  Neuro: AAOx3, no sensory/motor deficits

## 2018-10-05 NOTE — ED PROVIDER NOTE - OBJECTIVE STATEMENT
24 yo F hx of anxiety here c/o chills, CP, SOB, and lost  voice today. No f/n/v/c/d. Also c/o right leg pains and vaginal itch x 2 days.  Sexually active with 1 partner. Not concerned about STD's. LMP last week. No recent travel or OCP use.

## 2018-10-05 NOTE — ED PROVIDER NOTE - NS ED ROS FT
Review of Systems    Constitutional: (-) fever  Eyes/ENT: (-) blurry vision, (-) epistaxis, (+) lost voice  Cardiovascular: (+) chest pain, (-) syncope  Respiratory: (-) cough, (+) shortness of breath  Gastrointestinal: (-) vomiting, (-) diarrhea  Musculoskeletal: (-) neck pain, (-) back pain, (+) right calf pain  Integumentary: (-) rash, (-) edema  Neurological: (-) headache, (-) altered mental status  Psychiatric: (-) hallucinations  Allergic/Immunologic: (-) pruritus

## 2018-10-05 NOTE — ED PROVIDER NOTE - ATTENDING CONTRIBUTION TO CARE
24yo woman h/o anxiety presented with multiple complaints including subjective fever, chills, sore throat, cough, SOB, hoarse voice x 1  day, body aches, vaginal itch x 2 days. Sexually active, 1 partner. On exam, pt nontoxic appearing but very anxious, hoarse voice with barking cough noted, mild pharyngeal erythema, mildly tender over palpation of the larynx, TMs clear, lungs CTA, no wheeze, normal work of breathing, abd soft. NT. Plan for decadron, pelvic exam, ENT to scope. Sx most likely viral in etiology but given hoarseness, remote possibility of epiglottitis should be considered.

## 2018-10-09 ENCOUNTER — EMERGENCY (EMERGENCY)
Facility: HOSPITAL | Age: 23
LOS: 0 days | Discharge: LEFT BEFORE DISCHARGE | End: 2018-10-09
Admitting: PHYSICIAN ASSISTANT

## 2018-10-09 VITALS
HEART RATE: 111 BPM | DIASTOLIC BLOOD PRESSURE: 69 MMHG | OXYGEN SATURATION: 97 % | RESPIRATION RATE: 18 BRPM | SYSTOLIC BLOOD PRESSURE: 118 MMHG | TEMPERATURE: 98 F

## 2018-10-09 VITALS — HEART RATE: 80 BPM
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
## 2018-10-09 DIAGNOSIS — Z79.2 LONG TERM (CURRENT) USE OF ANTIBIOTICS: ICD-10-CM

## 2018-10-09 DIAGNOSIS — M79.661 PAIN IN RIGHT LOWER LEG: ICD-10-CM

## 2018-10-09 DIAGNOSIS — J45.909 UNSPECIFIED ASTHMA, UNCOMPLICATED: ICD-10-CM

## 2018-10-09 DIAGNOSIS — M25.569 PAIN IN UNSPECIFIED KNEE: ICD-10-CM
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
## 2018-10-09 DIAGNOSIS — M25.571 PAIN IN RIGHT ANKLE AND JOINTS OF RIGHT FOOT: ICD-10-CM

## 2018-10-09 DIAGNOSIS — M25.561 PAIN IN RIGHT KNEE: ICD-10-CM
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
## 2018-10-09 DIAGNOSIS — Z79.52 LONG TERM (CURRENT) USE OF SYSTEMIC STEROIDS: ICD-10-CM

## 2018-10-09 DIAGNOSIS — Z79.51 LONG TERM (CURRENT) USE OF INHALED STEROIDS: ICD-10-CM

## 2018-10-09 RX ORDER — IBUPROFEN 200 MG
600 TABLET ORAL ONCE
Qty: 0 | Refills: 0 | Status: COMPLETED | OUTPATIENT
Start: 2018-10-09 | End: 2018-10-09

## 2018-10-09 RX ADMIN — Medication 600 MILLIGRAM(S): at 18:03

## 2018-10-09 NOTE — ED PROVIDER NOTE - OBJECTIVE STATEMENT
Pt is a 24 yo F c/o R knee and ankle pain sp fall 2 days ago. Pt sts she fell and hit her knee while twisting her ankle. Reports pain when walking and sts she has been "limping around the house". No head injury. Pt has not tried taking anything for the pain.

## 2018-10-09 NOTE — ED ADULT TRIAGE NOTE - CHIEF COMPLAINT QUOTE
patient c/o B/L knee,  ankle pain , b/L feet pain , S/P fall 2 days, patient denies any head trauma or LOC patient c/o B/L knee,  B/L ankle pain , b/L feet pain , S/P fall 2 days, patient denies any head trauma or LOC

## 2018-10-09 NOTE — ED ADULT NURSE NOTE - CHIEF COMPLAINT QUOTE
patient c/o B/L knee,  B/L ankle pain , b/L feet pain , S/P fall 2 days, patient denies any head trauma or LOC

## 2018-10-09 NOTE — ED PROVIDER NOTE - PHYSICAL EXAMINATION
CONST: Well appearing in NAD  EYES: PERRL, EOMI, Sclera and conjunctiva clear.  ENT: No nasal discharge. TM's clear B/L without drainage. Oropharynx normal appearing, no erythema or exudates. Uvula midline.  NECK: Non-tender, no meningeal signs  CARD: Normal S1 S2; Normal rate and rhythm  RESP: Equal BS B/L, No wheezes, rhonchi or rales. No distress  MS: Normal ROM in all extremities. No midline spinal tenderness. Mild tenderness to R anterior knee and R ankle, no obvious deformity. no swelling.

## 2018-10-09 NOTE — ED ADULT NURSE NOTE - OBJECTIVE STATEMENT
pt presents to the ED with c/o right knee and ankle pain from a fall she had 2 days ago. pt denies head trauma or LOC.

## 2018-10-09 NOTE — ED PROVIDER NOTE - NS ED ROS FT
CONST: No fever, chills or bodyaches  EYES: No pain, redness, drainage or visual changes.  ENT: No ear pain or discharge, nasal discharge or congestion. No sore throat  CARD: No chest pain, palpitations  RESP: No SOB, cough, hemoptysis. No hx of asthma or COPD  GI: No abdominal pain, N/V/D  MS: R knee and R ankle pain

## 2018-10-10 ENCOUNTER — EMERGENCY (EMERGENCY)
Facility: HOSPITAL | Age: 23
LOS: 0 days | Discharge: LEFT BEFORE DISCHARGE | End: 2018-10-10
Admitting: PHYSICIAN ASSISTANT

## 2018-10-10 VITALS
OXYGEN SATURATION: 100 % | HEART RATE: 94 BPM | TEMPERATURE: 98 F | DIASTOLIC BLOOD PRESSURE: 71 MMHG | RESPIRATION RATE: 18 BRPM | SYSTOLIC BLOOD PRESSURE: 117 MMHG | WEIGHT: 139.99 LBS

## 2018-10-10 DIAGNOSIS — Z79.899 OTHER LONG TERM (CURRENT) DRUG THERAPY: ICD-10-CM
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
## 2018-10-10 DIAGNOSIS — J45.909 UNSPECIFIED ASTHMA, UNCOMPLICATED: ICD-10-CM

## 2018-10-10 DIAGNOSIS — M25.571 PAIN IN RIGHT ANKLE AND JOINTS OF RIGHT FOOT: ICD-10-CM
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
## 2018-10-10 DIAGNOSIS — Z79.51 LONG TERM (CURRENT) USE OF INHALED STEROIDS: ICD-10-CM
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
## 2018-10-11 NOTE — ED PROVIDER NOTE - PHYSICAL EXAMINATION
CONST: Well appearing in NAD  EYES:  Sclera and conjunctiva clear.  MS: + TTP of right lateral malleolus, pain with inversion of foot, no overlying swelling or ecchymosis, Normal ROM in all extremities. pedal pulses 2+ bilaterally, capillary refill < 2 seconds  SKIN: Warm, dry, no acute rashes. Good turgor  NEURO: Strength 5/5 with no sensory deficits. antalgic gait favoring left side.

## 2018-10-11 NOTE — ED PROVIDER NOTE - OBJECTIVE STATEMENT
23 y.o female with no sig Pmhx presents to the ED for evalaution of ankle pain 23 y.o female with no sig Pmhx presents to the ED for evaluation of R ankle pain s/p inversion injury x 2 days.  Since onset has been experiencing pain with ambulation and alleviated with rest.  took NSAIDS with some relief of the pain.  was seen in the ED last night but eloped.  Denies paresthesias of foot, knee pain, hip pain, head injury,

## 2018-10-11 NOTE — ED PROVIDER NOTE - NS ED ROS FT
CONST: No fever, chills or body aches  MS: + left ankle pain. No knee pain, hip pain or back pain  SKIN: No rashes  NEURO: No paresthesias

## 2018-10-17 ENCOUNTER — OUTPATIENT (OUTPATIENT)
Dept: OUTPATIENT SERVICES | Facility: HOSPITAL | Age: 23
LOS: 1 days | Discharge: HOME | End: 2018-10-17

## 2018-10-17 DIAGNOSIS — F33.1 MAJOR DEPRESSIVE DISORDER, RECURRENT, MODERATE: ICD-10-CM

## 2018-11-14 ENCOUNTER — EMERGENCY (EMERGENCY)
Facility: HOSPITAL | Age: 23
LOS: 0 days | Discharge: HOME | End: 2018-11-14
Admitting: PHYSICIAN ASSISTANT

## 2018-11-14 VITALS
OXYGEN SATURATION: 98 % | RESPIRATION RATE: 18 BRPM | DIASTOLIC BLOOD PRESSURE: 77 MMHG | TEMPERATURE: 97 F | HEART RATE: 67 BPM | SYSTOLIC BLOOD PRESSURE: 111 MMHG
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
## 2018-11-14 DIAGNOSIS — S30.0XXA CONTUSION OF LOWER BACK AND PELVIS, INITIAL ENCOUNTER: ICD-10-CM

## 2018-11-14 DIAGNOSIS — Z79.899 OTHER LONG TERM (CURRENT) DRUG THERAPY: ICD-10-CM

## 2018-11-14 DIAGNOSIS — M54.9 DORSALGIA, UNSPECIFIED: ICD-10-CM

## 2018-11-14 DIAGNOSIS — J45.909 UNSPECIFIED ASTHMA, UNCOMPLICATED: ICD-10-CM
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
## 2018-11-14 DIAGNOSIS — Z79.2 LONG TERM (CURRENT) USE OF ANTIBIOTICS: ICD-10-CM
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
## 2018-11-14 DIAGNOSIS — F17.200 NICOTINE DEPENDENCE, UNSPECIFIED, UNCOMPLICATED: ICD-10-CM

## 2018-11-14 DIAGNOSIS — Z79.52 LONG TERM (CURRENT) USE OF SYSTEMIC STEROIDS: ICD-10-CM
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
## 2018-11-14 RX ADMIN — Medication 500 MILLIGRAM(S): at 14:32

## 2018-11-14 NOTE — ED PROVIDER NOTE - NS ED ROS FT
Constitutional:  no fevers, no chills, no malaise  Eyes:  No visual changes  ENMT: No neck pain or stiffness, no nasal congestion, no ear pain, no throat pain  Cardiac:  No chest pain  Respiratory:  No cough or sob  GI:  No nausea, vomiting, diarrhea or abdominal pain.  :  No dysuria, frequency or burning.  MS:  + back pain, no joint pain.  Neuro:  No headache, no dizziness, no change in mental status  Skin:  No skin rash  Except as documented in the HPI,  all other systems are negative

## 2018-11-14 NOTE — ED PROVIDER NOTE - CARE PROVIDER_API CALL
Philipp Leary), Pediatrics  [address] [place] [address]  Phone: [phone]  Fax: [phone]

## 2018-11-14 NOTE — ED PROVIDER NOTE - PHYSICAL EXAMINATION
CONSTITUTIONAL: Well-developed; well-nourished; in no acute distress, nontoxic appearing  SKIN: skin exam is warm and dry,  HEAD: Normocephalic; atraumatic.  EYES: PERRL, 3 mm bilateral, no nystagmus, EOM intact; conjunctiva and sclera clear.  ENT: MMM, no nasal congestion  NECK: Supple; non tender.+ full passive ROM in all directions. No JVD  CARD: S1, S2 normal, no murmur  RESP: No wheezes, rales or rhonchi. Good air movement bilaterally  ABD: soft; non-distended; non-tender. No Rebound, No guarding  BACK: Decreased ROM secondary to pain, + paraspinally tenderness along thoracis and lumbar spine area, no e/e/e, no breaks in skin, + 2 periph pulses  EXT: Normal ROM. No cyanosis or edema. Dp Pulses intact.   NEURO: awake, alert, following commands, oriented, grossly unremarkable. No Focal deficits. GCS 15.   PSYCH: Cooperative, appropriate.

## 2018-11-14 NOTE — ED PROVIDER NOTE - OBJECTIVE STATEMENT
23 y.o. female with PMH of Anxiety presents to the ED c/o back pain s/p slip and fall yesterday in her house.  Pt states she was walking down the stairs when her dog was walking down as well and caused her to trip and fall.  Pt c/o diffuse back pain, worse with movement.  Pt took one dose of motrin with minimal relief.  Pt denies any head trauma, LOC, urinary/ fecal complaints, numbness, tingling or any other complaints.

## 2018-11-21 ENCOUNTER — OUTPATIENT (OUTPATIENT)
Dept: OUTPATIENT SERVICES | Facility: HOSPITAL | Age: 23
LOS: 1 days | Discharge: HOME | End: 2018-11-21

## 2018-11-21 DIAGNOSIS — F33.1 MAJOR DEPRESSIVE DISORDER, RECURRENT, MODERATE: ICD-10-CM

## 2018-12-27 ENCOUNTER — EMERGENCY (EMERGENCY)
Facility: HOSPITAL | Age: 23
LOS: 0 days | Discharge: HOME | End: 2018-12-27
Admitting: PHYSICIAN ASSISTANT

## 2018-12-27 VITALS
SYSTOLIC BLOOD PRESSURE: 112 MMHG | TEMPERATURE: 97 F | HEART RATE: 82 BPM | RESPIRATION RATE: 18 BRPM | DIASTOLIC BLOOD PRESSURE: 56 MMHG | OXYGEN SATURATION: 99 %

## 2018-12-27 VITALS
HEART RATE: 122 BPM | TEMPERATURE: 99 F | DIASTOLIC BLOOD PRESSURE: 75 MMHG | OXYGEN SATURATION: 100 % | RESPIRATION RATE: 18 BRPM | SYSTOLIC BLOOD PRESSURE: 129 MMHG

## 2018-12-27 DIAGNOSIS — R19.7 DIARRHEA, UNSPECIFIED: ICD-10-CM

## 2018-12-27 DIAGNOSIS — Z79.52 LONG TERM (CURRENT) USE OF SYSTEMIC STEROIDS: ICD-10-CM

## 2018-12-27 DIAGNOSIS — J02.9 ACUTE PHARYNGITIS, UNSPECIFIED: ICD-10-CM

## 2018-12-27 DIAGNOSIS — F17.200 NICOTINE DEPENDENCE, UNSPECIFIED, UNCOMPLICATED: ICD-10-CM

## 2018-12-27 DIAGNOSIS — J45.909 UNSPECIFIED ASTHMA, UNCOMPLICATED: ICD-10-CM

## 2018-12-27 DIAGNOSIS — R11.2 NAUSEA WITH VOMITING, UNSPECIFIED: ICD-10-CM

## 2018-12-27 DIAGNOSIS — Z79.2 LONG TERM (CURRENT) USE OF ANTIBIOTICS: ICD-10-CM

## 2018-12-27 DIAGNOSIS — Z79.51 LONG TERM (CURRENT) USE OF INHALED STEROIDS: ICD-10-CM

## 2018-12-27 DIAGNOSIS — Z79.899 OTHER LONG TERM (CURRENT) DRUG THERAPY: ICD-10-CM

## 2018-12-27 RX ORDER — ONDANSETRON 8 MG/1
8 TABLET, FILM COATED ORAL ONCE
Qty: 0 | Refills: 0 | Status: DISCONTINUED | OUTPATIENT
Start: 2018-12-27 | End: 2018-12-27

## 2018-12-27 RX ORDER — SODIUM CHLORIDE 9 MG/ML
1000 INJECTION INTRAMUSCULAR; INTRAVENOUS; SUBCUTANEOUS ONCE
Qty: 0 | Refills: 0 | Status: COMPLETED | OUTPATIENT
Start: 2018-12-27 | End: 2018-12-27

## 2018-12-27 RX ORDER — ONDANSETRON 8 MG/1
8 TABLET, FILM COATED ORAL ONCE
Qty: 0 | Refills: 0 | Status: COMPLETED | OUTPATIENT
Start: 2018-12-27 | End: 2018-12-27

## 2018-12-27 RX ORDER — KETOROLAC TROMETHAMINE 30 MG/ML
15 SYRINGE (ML) INJECTION ONCE
Qty: 0 | Refills: 0 | Status: DISCONTINUED | OUTPATIENT
Start: 2018-12-27 | End: 2018-12-27

## 2018-12-27 RX ADMIN — SODIUM CHLORIDE 1000 MILLILITER(S): 9 INJECTION INTRAMUSCULAR; INTRAVENOUS; SUBCUTANEOUS at 15:06

## 2018-12-27 RX ADMIN — ONDANSETRON 8 MILLIGRAM(S): 8 TABLET, FILM COATED ORAL at 15:06

## 2018-12-27 RX ADMIN — Medication 15 MILLIGRAM(S): at 15:06

## 2018-12-27 NOTE — ED PROVIDER NOTE - MEDICAL DECISION MAKING DETAILS
continue po anbx; increase po fluids; bland diet- advance as tolerated; pt will follow up with PCP in 2-3 days; any new or worsening symptoms, pt will return to ER.   Note complete

## 2018-12-27 NOTE — ED PROVIDER NOTE - NSFOLLOWUPCLINICS_GEN_ALL_ED_FT
[facility]  Medicine  [address], [place]   Phone: [phone]  Fax:   Follow Up Time:

## 2018-12-27 NOTE — ED ADULT NURSE NOTE - NSIMPLEMENTINTERV_GEN_ALL_ED
Implemented All Universal Safety Interventions:  [place] to call system. Call bell, personal items and telephone within reach. Instruct patient to call for assistance. Room bathroom lighting operational. Non-slip footwear when patient is off stretcher. Physically safe environment: no spills, clutter or unnecessary equipment. Stretcher in lowest position, wheels locked, appropriate side rails in place.

## 2018-12-27 NOTE — ED PROVIDER NOTE - ENMT, MLM
Airway patent, Nasal mucosa clear. Mouth with normal mucosa. Throat has no vesicles, no oropharyngeal exudates and uvula is midline. TM's clear bilaterally.  No sinus tenderness.

## 2018-12-27 NOTE — ED PROVIDER NOTE - OBJECTIVE STATEMENT
23 y.o. female with a PMH of anxiety, Lyme disease, and congenital murmur presented the ER c/o sore throat, fever, chills, nausea, vomiting, diarrhea, myalgias, and cough for the past 3 days.  Pt seen and tx at [facility] for strept throat.  Pt denies drooling, dysphagia, SOB, abdominal pain, dysuria.  No other complaints.

## 2018-12-27 NOTE — ED ADULT NURSE NOTE - OBJECTIVE STATEMENT
Pt presents to ED with c/o cough, sore throat, hoarseness, myalgias, vomiting and subjective fever 3 days ago. Pt seen at urgent care and given antibiotics (unsure of name).

## 2019-01-07 ENCOUNTER — EMERGENCY (EMERGENCY)
Facility: HOSPITAL | Age: 24
LOS: 0 days | Discharge: HOME | End: 2019-01-08
Admitting: PHYSICIAN ASSISTANT

## 2019-01-07 VITALS
DIASTOLIC BLOOD PRESSURE: 66 MMHG | SYSTOLIC BLOOD PRESSURE: 110 MMHG | WEIGHT: 130.07 LBS | HEART RATE: 86 BPM | RESPIRATION RATE: 18 BRPM | OXYGEN SATURATION: 99 %

## 2019-01-07 DIAGNOSIS — Z79.899 OTHER LONG TERM (CURRENT) DRUG THERAPY: ICD-10-CM

## 2019-01-07 DIAGNOSIS — M25.562 PAIN IN LEFT KNEE: ICD-10-CM
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
## 2019-01-07 DIAGNOSIS — Z79.51 LONG TERM (CURRENT) USE OF INHALED STEROIDS: ICD-10-CM
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
## 2019-01-07 DIAGNOSIS — J45.909 UNSPECIFIED ASTHMA, UNCOMPLICATED: ICD-10-CM
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
## 2019-01-07 DIAGNOSIS — Z79.2 LONG TERM (CURRENT) USE OF ANTIBIOTICS: ICD-10-CM
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
## 2019-01-07 NOTE — ED PROVIDER NOTE - NSFOLLOWUPINSTRUCTIONS_ED_ALL_ED_FT
Sprain    A sprain is a stretch or tear in one of the tough, fiber-like tissues (ligaments) in your body. This is caused by an injury to the area such as a twisting mechanism. Symptoms include pain, swelling, or bruising. Rest that area over the next several days and slowly resume activity when tolerated. Ice can help with swelling and pain.     SEEK IMMEDIATE MEDICAL CARE IF YOU HAVE THE FOLLOWING SYMPTOMS: worsening pain, inability to move that body part, numbness or tingling.

## 2019-01-07 NOTE — ED PROVIDER NOTE - OBJECTIVE STATEMENT
23 year old female with pmhx noted, presents s/p fall. Pt admits fell down steps, hit left knee. pt admits to left knee pain. no head trauma, LOC, neck or back pain, paresthesias or weakness. no CP or SOB

## 2019-01-07 NOTE — ED PROVIDER NOTE - PHYSICAL EXAMINATION
CONST: Well appearing in NAD  EYES: PERRL, EOMI, Sclera and conjunctiva clear.   NECK: Non-tender, no meningeal signs. normal ROM. supple   CARD: Normal S1 S2; Normal rate and rhythm  RESP: Equal BS B/L, No wheezes, rhonchi or rales. No distress  MS: + tenderness to left knee, Normal ROM in all extremities. No midline spinal tenderness. pulses 2 +. no calf tenderness or swelling  SKIN: Warm, dry, no acute rashes. Good turgor  NEURO: A&Ox3, No focal deficits. Strength 5/5 with no sensory deficits. Steady gait.

## 2019-01-07 NOTE — ED PROVIDER NOTE - NS ED ROS FT
Review of Systems:  	•	CONSTITUTIONAL - no fever, no diaphoresis, no chills  	•	SKIN - no rash  	•	MUSCULOSKELETAL - + left knee pain   	•	NEUROLOGIC - no weakness, no paresthesias

## 2019-01-07 NOTE — ED PROVIDER NOTE - CARE PROVIDER_API CALL
Beto Daily (MD), Orthopaedic Surgery  [facility] [place] [address]  Phone: [phone]  Fax: [phone]

## 2019-01-08 RX ORDER — IBUPROFEN 200 MG
600 TABLET ORAL ONCE
Qty: 0 | Refills: 0 | Status: COMPLETED | OUTPATIENT
Start: 2019-01-08 | End: 2019-01-08

## 2019-01-15 ENCOUNTER — OUTPATIENT (OUTPATIENT)
Dept: OUTPATIENT SERVICES | Facility: HOSPITAL | Age: 24
LOS: 1 days | Discharge: HOME | End: 2019-01-15

## 2019-01-15 DIAGNOSIS — F33.1 MAJOR DEPRESSIVE DISORDER, RECURRENT, MODERATE: ICD-10-CM

## 2019-01-25 ENCOUNTER — EMERGENCY (EMERGENCY)
Facility: HOSPITAL | Age: 24
LOS: 1 days | Discharge: HOME | End: 2019-01-25
Attending: EMERGENCY MEDICINE | Admitting: EMERGENCY MEDICINE

## 2019-01-25 VITALS
RESPIRATION RATE: 18 BRPM | DIASTOLIC BLOOD PRESSURE: 80 MMHG | TEMPERATURE: 98 F | HEART RATE: 110 BPM | OXYGEN SATURATION: 98 % | SYSTOLIC BLOOD PRESSURE: 116 MMHG

## 2019-01-25 VITALS — HEART RATE: 90 BPM

## 2019-01-25 DIAGNOSIS — F41.9 ANXIETY DISORDER, UNSPECIFIED: ICD-10-CM

## 2019-01-25 DIAGNOSIS — Z79.52 LONG TERM (CURRENT) USE OF SYSTEMIC STEROIDS: ICD-10-CM

## 2019-01-25 DIAGNOSIS — Z79.899 OTHER LONG TERM (CURRENT) DRUG THERAPY: ICD-10-CM

## 2019-01-25 DIAGNOSIS — Z76.0 ENCOUNTER FOR ISSUE OF REPEAT PRESCRIPTION: ICD-10-CM

## 2019-01-25 DIAGNOSIS — Z79.2 LONG TERM (CURRENT) USE OF ANTIBIOTICS: ICD-10-CM

## 2019-01-25 DIAGNOSIS — Z79.51 LONG TERM (CURRENT) USE OF INHALED STEROIDS: ICD-10-CM

## 2019-01-25 DIAGNOSIS — J45.909 UNSPECIFIED ASTHMA, UNCOMPLICATED: ICD-10-CM

## 2019-01-25 RX ORDER — FLUOXETINE HCL 10 MG
1 CAPSULE ORAL
Qty: 14 | Refills: 0 | OUTPATIENT
Start: 2019-01-25 | End: 2019-02-07

## 2019-01-25 RX ORDER — OLANZAPINE 15 MG/1
1 TABLET, FILM COATED ORAL
Qty: 14 | Refills: 0 | OUTPATIENT
Start: 2019-01-25 | End: 2019-02-07

## 2019-01-25 RX ORDER — HYDROXYZINE HCL 10 MG
25 TABLET ORAL ONCE
Qty: 0 | Refills: 0 | Status: COMPLETED | OUTPATIENT
Start: 2019-01-25 | End: 2019-01-25

## 2019-01-25 RX ADMIN — Medication 25 MILLIGRAM(S): at 19:19

## 2019-01-25 NOTE — ED PROVIDER NOTE - NS ED ROS FT
General: No fevers, chills, nausea, vomiting  Eyes:  No visual changes, eye pain or discharge.  ENMT:  No hearing changes, pain, no sore throat or runny nose, no difficulty swallowing  Cardiac:  No chest pain, SOB or edema.  Respiratory:  No cough or respiratory distress.   GI:  No nausea, vomiting, diarrhea or abdominal pain.  :  No dysuria, frequency or burning.  MS:  No myalgia,   Neuro:   No LOC.  Skin:  No skin rash.   Endocrine: No history of thyroid disease or diabetes.

## 2019-01-25 NOTE — ED ADULT NURSE NOTE - AS SC BRADEN MOISTURE
Placed on PS trial.  Settings documented. Will continue to monitor and wean to extubate. (4) rarely moist
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
## 2019-01-25 NOTE — ED PROVIDER NOTE - PHYSICAL EXAMINATION
CONSTITUTIONAL: Well-developed; well-nourished; in no acute distress, speaking in full sentences  SKIN: warm, dry  HEAD: Normocephalic; atraumatic  EYES: PERRL, EOMI, no conjunctival erythema  ENT: No nasal discharge; airway clear, mucous membranes moist  NECK: Supple; non tender, FROM  CARD: +S1, S2 no murmurs, gallops, or rubs. Regular rate and rhythm. radial 2+  RESP: No wheezes, rales or rhonchi. CTABL  EXT: moves all extremities, ambulates wo assistance No clubbing, cyanosis or edema.   NEURO: Alert, oriented, grossly unremarkable, no focal deficits, cn ii-xii grossly intact  PSYCH: Cooperative, appropriate, denies suicidal or homicidal ideation
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
## 2019-01-25 NOTE — ED PROVIDER NOTE - NSFOLLOWUPINSTRUCTIONS_ED_ALL_ED_FT
Follow up with your primary care doctor in 1-3 days     Anxiety    Generalized anxiety disorder (PAULO) is a mental disorder. It is defined as anxiety that is not necessarily related to specific events or activities or is out of proportion to said events. Symptoms include restlessness, fatigue, difficulty concentrations, irritability and difficulty concentrating. It may interfere with life functions, including relationships, work, and school. If you were started on a medication, make sure to take exactly as prescribed and follow up with a psychiatrist.    SEEK IMMEDIATE MEDICAL CARE IF YOU HAVE ANY OF THE FOLLOWING SYMPTOMS: thoughts about hurting killing yourself, thoughts about hurting or killing somebody else, hallucinations, or worsening depression.

## 2019-02-12 ENCOUNTER — FORM ENCOUNTER (OUTPATIENT)
Age: 24
End: 2019-02-12

## 2019-03-06 ENCOUNTER — OUTPATIENT (OUTPATIENT)
Dept: OUTPATIENT SERVICES | Facility: HOSPITAL | Age: 24
LOS: 1 days | Discharge: HOME | End: 2019-03-06

## 2019-03-06 DIAGNOSIS — F81.9 DEVELOPMENTAL DISORDER OF SCHOLASTIC SKILLS, UNSPECIFIED: ICD-10-CM

## 2019-03-06 DIAGNOSIS — F79 UNSPECIFIED INTELLECTUAL DISABILITIES: ICD-10-CM

## 2019-03-25 ENCOUNTER — EMERGENCY (EMERGENCY)
Facility: HOSPITAL | Age: 24
LOS: 0 days | Discharge: HOME | End: 2019-03-25
Attending: EMERGENCY MEDICINE | Admitting: EMERGENCY MEDICINE

## 2019-03-25 VITALS
HEIGHT: 65 IN | RESPIRATION RATE: 18 BRPM | OXYGEN SATURATION: 99 % | HEART RATE: 117 BPM | SYSTOLIC BLOOD PRESSURE: 129 MMHG | TEMPERATURE: 97 F | WEIGHT: 162.04 LBS | DIASTOLIC BLOOD PRESSURE: 80 MMHG

## 2019-03-25 VITALS — HEART RATE: 89 BPM | OXYGEN SATURATION: 100 % | RESPIRATION RATE: 18 BRPM

## 2019-03-25 DIAGNOSIS — J45.909 UNSPECIFIED ASTHMA, UNCOMPLICATED: ICD-10-CM

## 2019-03-25 DIAGNOSIS — Z79.51 LONG TERM (CURRENT) USE OF INHALED STEROIDS: ICD-10-CM
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
## 2019-03-25 DIAGNOSIS — Z3A.01 LESS THAN 8 WEEKS GESTATION OF PREGNANCY: ICD-10-CM

## 2019-03-25 DIAGNOSIS — Z79.52 LONG TERM (CURRENT) USE OF SYSTEMIC STEROIDS: ICD-10-CM

## 2019-03-25 DIAGNOSIS — F41.9 ANXIETY DISORDER, UNSPECIFIED: ICD-10-CM
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
## 2019-03-25 DIAGNOSIS — F17.200 NICOTINE DEPENDENCE, UNSPECIFIED, UNCOMPLICATED: ICD-10-CM

## 2019-03-25 DIAGNOSIS — O9A.211 INJURY, POISONING AND CERTAIN OTHER CONSEQUENCES OF EXTERNAL CAUSES COMPLICATING PREGNANCY, FIRST TRIMESTER: ICD-10-CM
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
## 2019-03-25 DIAGNOSIS — M25.579 PAIN IN UNSPECIFIED ANKLE AND JOINTS OF UNSPECIFIED FOOT: ICD-10-CM

## 2019-03-25 DIAGNOSIS — S99.912A UNSPECIFIED INJURY OF LEFT ANKLE, INITIAL ENCOUNTER: ICD-10-CM
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
## 2019-03-25 RX ORDER — ACETAMINOPHEN 500 MG
650 TABLET ORAL ONCE
Qty: 0 | Refills: 0 | Status: COMPLETED | OUTPATIENT
Start: 2019-03-25 | End: 2019-03-25

## 2019-03-25 RX ADMIN — Medication 650 MILLIGRAM(S): at 14:31

## 2019-03-25 NOTE — ED ADULT NURSE NOTE - NSIMPLEMENTINTERV_GEN_ALL_ED
Implemented All Fall with Harm Risk Interventions:  [place] to call system. Call bell, personal items and telephone within reach. Instruct patient to call for assistance. Room bathroom lighting operational. Non-slip footwear when patient is off stretcher. Physically safe environment: no spills, clutter or unnecessary equipment. Stretcher in lowest position, wheels locked, appropriate side rails in place. Provide visual cue, wrist band, yellow gown, etc. Monitor gait and stability. Monitor for mental status changes and reorient to person, place, and time. Review medications for side effects contributing to fall risk. Reinforce activity limits and safety measures with patient and family. Provide visual clues: red socks.

## 2019-03-25 NOTE — ED PROVIDER NOTE - NS ED ROS FT
Constitutional: (-) fever  Skin: (-) rash  Muskuloskeletal: (+) left ankle pain  Neurological: (-) altered mental status
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
## 2019-03-25 NOTE — ED PROVIDER NOTE - PHYSICAL EXAMINATION
Physical Exam    Vital Signs: I have reviewed the initial vital signs.  Constitutional: well-nourished, appears stated age, no acute distress  Skin: warm, dry  Muskuloskeletal: left ankle: + tender to palpation anterior and lateral ankle. No swelling or ecchymosis. decreased ROM due to pain. n/v intact. NT to left foot or knee  Neuro: AOx3, No focal deficits noted

## 2019-03-25 NOTE — ED PROVIDER NOTE - PROGRESS NOTE DETAILS
Patient is pregnant. Risk and benefits of xray discussed.  Refused xray. Will splint, crutch, f/u with ortho Anterior tenderness of ankle, painful ROM. Pt refuses XR, splint applied Ortho f/u

## 2019-03-25 NOTE — ED PROVIDER NOTE - CARE PROVIDER_API CALL
Beto Daily (MD)  Orthopaedic Surgery  [address] [place] [address]  Phone: [phone]  Fax: [phone]  Follow Up Time: 4-6 Days

## 2019-04-02 ENCOUNTER — FORM ENCOUNTER (OUTPATIENT)
Age: 24
End: 2019-04-02

## 2019-04-14 ENCOUNTER — EMERGENCY (EMERGENCY)
Facility: HOSPITAL | Age: 24
LOS: 0 days | Discharge: HOME | End: 2019-04-14
Attending: EMERGENCY MEDICINE | Admitting: EMERGENCY MEDICINE
Payer: MEDICAID

## 2019-04-14 VITALS
SYSTOLIC BLOOD PRESSURE: 130 MMHG | TEMPERATURE: 98 F | HEART RATE: 152 BPM | RESPIRATION RATE: 23 BRPM | OXYGEN SATURATION: 99 % | DIASTOLIC BLOOD PRESSURE: 72 MMHG

## 2019-04-14 VITALS
SYSTOLIC BLOOD PRESSURE: 114 MMHG | DIASTOLIC BLOOD PRESSURE: 60 MMHG | HEART RATE: 100 BPM | RESPIRATION RATE: 18 BRPM | OXYGEN SATURATION: 99 %

## 2019-04-14 DIAGNOSIS — R11.10 VOMITING, UNSPECIFIED: ICD-10-CM

## 2019-04-14 DIAGNOSIS — Z79.2 LONG TERM (CURRENT) USE OF ANTIBIOTICS: ICD-10-CM

## 2019-04-14 DIAGNOSIS — Z79.51 LONG TERM (CURRENT) USE OF INHALED STEROIDS: ICD-10-CM

## 2019-04-14 DIAGNOSIS — R00.0 TACHYCARDIA, UNSPECIFIED: ICD-10-CM

## 2019-04-14 DIAGNOSIS — Z79.899 OTHER LONG TERM (CURRENT) DRUG THERAPY: ICD-10-CM

## 2019-04-14 DIAGNOSIS — J45.909 UNSPECIFIED ASTHMA, UNCOMPLICATED: ICD-10-CM

## 2019-04-14 DIAGNOSIS — Z3A.09 9 WEEKS GESTATION OF PREGNANCY: ICD-10-CM

## 2019-04-14 DIAGNOSIS — R41.9 UNSPECIFIED SYMPTOMS AND SIGNS INVOLVING COGNITIVE FUNCTIONS AND AWARENESS: ICD-10-CM

## 2019-04-14 DIAGNOSIS — Z79.52 LONG TERM (CURRENT) USE OF SYSTEMIC STEROIDS: ICD-10-CM

## 2019-04-14 DIAGNOSIS — O21.9 VOMITING OF PREGNANCY, UNSPECIFIED: ICD-10-CM

## 2019-04-14 DIAGNOSIS — R42 DIZZINESS AND GIDDINESS: ICD-10-CM

## 2019-04-14 LAB
ALBUMIN SERPL ELPH-MCNC: 4.7 G/DL — SIGNIFICANT CHANGE UP (ref 3.5–5.2)
ALP SERPL-CCNC: 84 U/L — SIGNIFICANT CHANGE UP (ref 30–115)
ALT FLD-CCNC: 10 U/L — SIGNIFICANT CHANGE UP (ref 0–41)
ANION GAP SERPL CALC-SCNC: 16 MMOL/L — HIGH (ref 7–14)
APPEARANCE UR: ABNORMAL
AST SERPL-CCNC: 15 U/L — SIGNIFICANT CHANGE UP (ref 0–41)
BACTERIA # UR AUTO: ABNORMAL /HPF
BASOPHILS # BLD AUTO: 0.04 K/UL — SIGNIFICANT CHANGE UP (ref 0–0.2)
BASOPHILS NFR BLD AUTO: 0.4 % — SIGNIFICANT CHANGE UP (ref 0–1)
BILIRUB SERPL-MCNC: 0.5 MG/DL — SIGNIFICANT CHANGE UP (ref 0.2–1.2)
BILIRUB UR-MCNC: ABNORMAL
BUN SERPL-MCNC: 12 MG/DL — SIGNIFICANT CHANGE UP (ref 10–20)
CALCIUM SERPL-MCNC: 10.1 MG/DL — SIGNIFICANT CHANGE UP (ref 8.5–10.1)
CHLORIDE SERPL-SCNC: 102 MMOL/L — SIGNIFICANT CHANGE UP (ref 98–110)
CO2 SERPL-SCNC: 21 MMOL/L — SIGNIFICANT CHANGE UP (ref 17–32)
COLOR SPEC: SIGNIFICANT CHANGE UP
CREAT SERPL-MCNC: 0.6 MG/DL — LOW (ref 0.7–1.5)
DIFF PNL FLD: ABNORMAL
EOSINOPHIL # BLD AUTO: 0.03 K/UL — SIGNIFICANT CHANGE UP (ref 0–0.7)
EOSINOPHIL NFR BLD AUTO: 0.3 % — SIGNIFICANT CHANGE UP (ref 0–8)
EPI CELLS # UR: ABNORMAL /HPF
GLUCOSE SERPL-MCNC: 96 MG/DL — SIGNIFICANT CHANGE UP (ref 70–99)
GLUCOSE UR QL: NEGATIVE MG/DL — SIGNIFICANT CHANGE UP
HCG SERPL-ACNC: HIGH MIU/ML
HCT VFR BLD CALC: 40.9 % — SIGNIFICANT CHANGE UP (ref 37–47)
HGB BLD-MCNC: 14.4 G/DL — SIGNIFICANT CHANGE UP (ref 12–16)
IMM GRANULOCYTES NFR BLD AUTO: 0.4 % — HIGH (ref 0.1–0.3)
KETONES UR-MCNC: >=80
LACTATE SERPL-SCNC: 1.5 MMOL/L — SIGNIFICANT CHANGE UP (ref 0.5–2.2)
LEUKOCYTE ESTERASE UR-ACNC: ABNORMAL
LIDOCAIN IGE QN: 26 U/L — SIGNIFICANT CHANGE UP (ref 7–60)
LYMPHOCYTES # BLD AUTO: 1.96 K/UL — SIGNIFICANT CHANGE UP (ref 1.2–3.4)
LYMPHOCYTES # BLD AUTO: 20 % — LOW (ref 20.5–51.1)
MAGNESIUM SERPL-MCNC: 1.6 MG/DL — LOW (ref 1.8–2.4)
MCHC RBC-ENTMCNC: 29.2 PG — SIGNIFICANT CHANGE UP (ref 27–31)
MCHC RBC-ENTMCNC: 35.2 G/DL — SIGNIFICANT CHANGE UP (ref 32–37)
MCV RBC AUTO: 83 FL — SIGNIFICANT CHANGE UP (ref 81–99)
MONOCYTES # BLD AUTO: 0.74 K/UL — HIGH (ref 0.1–0.6)
MONOCYTES NFR BLD AUTO: 7.5 % — SIGNIFICANT CHANGE UP (ref 1.7–9.3)
NEUTROPHILS # BLD AUTO: 7.01 K/UL — HIGH (ref 1.4–6.5)
NEUTROPHILS NFR BLD AUTO: 71.4 % — SIGNIFICANT CHANGE UP (ref 42.2–75.2)
NITRITE UR-MCNC: NEGATIVE — SIGNIFICANT CHANGE UP
NRBC # BLD: 0 /100 WBCS — SIGNIFICANT CHANGE UP (ref 0–0)
PH UR: 6 — SIGNIFICANT CHANGE UP (ref 5–8)
PLATELET # BLD AUTO: 253 K/UL — SIGNIFICANT CHANGE UP (ref 130–400)
POTASSIUM SERPL-MCNC: 3.7 MMOL/L — SIGNIFICANT CHANGE UP (ref 3.5–5)
POTASSIUM SERPL-SCNC: 3.7 MMOL/L — SIGNIFICANT CHANGE UP (ref 3.5–5)
PROT SERPL-MCNC: 7.1 G/DL — SIGNIFICANT CHANGE UP (ref 6–8)
PROT UR-MCNC: 30 MG/DL
RBC # BLD: 4.93 M/UL — SIGNIFICANT CHANGE UP (ref 4.2–5.4)
RBC # FLD: 12.5 % — SIGNIFICANT CHANGE UP (ref 11.5–14.5)
RBC CASTS # UR COMP ASSIST: ABNORMAL /HPF
SODIUM SERPL-SCNC: 139 MMOL/L — SIGNIFICANT CHANGE UP (ref 135–146)
SP GR SPEC: 1.02 — SIGNIFICANT CHANGE UP (ref 1.01–1.03)
UROBILINOGEN FLD QL: 1 MG/DL (ref 0.2–0.2)
WBC # BLD: 9.82 K/UL — SIGNIFICANT CHANGE UP (ref 4.8–10.8)
WBC # FLD AUTO: 9.82 K/UL — SIGNIFICANT CHANGE UP (ref 4.8–10.8)
WBC UR QL: SIGNIFICANT CHANGE UP /HPF

## 2019-04-14 PROCEDURE — 99285 EMERGENCY DEPT VISIT HI MDM: CPT

## 2019-04-14 RX ORDER — ONDANSETRON 8 MG/1
4 TABLET, FILM COATED ORAL ONCE
Qty: 0 | Refills: 0 | Status: COMPLETED | OUTPATIENT
Start: 2019-04-14 | End: 2019-04-14

## 2019-04-14 RX ORDER — PROCHLORPERAZINE MALEATE 5 MG
25 TABLET ORAL ONCE
Qty: 0 | Refills: 0 | Status: DISCONTINUED | OUTPATIENT
Start: 2019-04-14 | End: 2019-04-14

## 2019-04-14 RX ORDER — ACETAMINOPHEN 500 MG
650 TABLET ORAL ONCE
Qty: 0 | Refills: 0 | Status: COMPLETED | OUTPATIENT
Start: 2019-04-14 | End: 2019-04-14

## 2019-04-14 RX ORDER — SODIUM CHLORIDE 9 MG/ML
3 INJECTION INTRAMUSCULAR; INTRAVENOUS; SUBCUTANEOUS ONCE
Qty: 0 | Refills: 0 | Status: COMPLETED | OUTPATIENT
Start: 2019-04-14 | End: 2019-04-14

## 2019-04-14 RX ORDER — SODIUM CHLORIDE 9 MG/ML
1000 INJECTION INTRAMUSCULAR; INTRAVENOUS; SUBCUTANEOUS ONCE
Qty: 0 | Refills: 0 | Status: DISCONTINUED | OUTPATIENT
Start: 2019-04-14 | End: 2019-04-14

## 2019-04-14 RX ORDER — SODIUM CHLORIDE 9 MG/ML
2000 INJECTION, SOLUTION INTRAVENOUS
Qty: 0 | Refills: 0 | Status: DISCONTINUED | OUTPATIENT
Start: 2019-04-14 | End: 2019-04-14

## 2019-04-14 RX ORDER — PROCHLORPERAZINE MALEATE 5 MG
2.5 TABLET ORAL ONCE
Qty: 0 | Refills: 0 | Status: DISCONTINUED | OUTPATIENT
Start: 2019-04-14 | End: 2019-04-14

## 2019-04-14 RX ORDER — METOCLOPRAMIDE HCL 10 MG
10 TABLET ORAL ONCE
Qty: 0 | Refills: 0 | Status: COMPLETED | OUTPATIENT
Start: 2019-04-14 | End: 2019-04-14

## 2019-04-14 RX ORDER — DIPHENHYDRAMINE HCL 50 MG
25 CAPSULE ORAL ONCE
Qty: 0 | Refills: 0 | Status: COMPLETED | OUTPATIENT
Start: 2019-04-14 | End: 2019-04-14

## 2019-04-14 RX ORDER — MAGNESIUM SULFATE 500 MG/ML
2 VIAL (ML) INJECTION ONCE
Qty: 0 | Refills: 0 | Status: COMPLETED | OUTPATIENT
Start: 2019-04-14 | End: 2019-04-14

## 2019-04-14 RX ADMIN — SODIUM CHLORIDE 3 MILLILITER(S): 9 INJECTION INTRAMUSCULAR; INTRAVENOUS; SUBCUTANEOUS at 20:09

## 2019-04-14 RX ADMIN — ONDANSETRON 4 MILLIGRAM(S): 8 TABLET, FILM COATED ORAL at 19:51

## 2019-04-14 RX ADMIN — Medication 650 MILLIGRAM(S): at 19:52

## 2019-04-14 RX ADMIN — SODIUM CHLORIDE 1000 MILLILITER(S): 9 INJECTION, SOLUTION INTRAVENOUS at 19:38

## 2019-04-14 RX ADMIN — ONDANSETRON 4 MILLIGRAM(S): 8 TABLET, FILM COATED ORAL at 20:20

## 2019-04-14 RX ADMIN — Medication 10 MILLIGRAM(S): at 20:20

## 2019-04-14 RX ADMIN — Medication 25 MILLIGRAM(S): at 20:33

## 2019-04-14 RX ADMIN — Medication 50 GRAM(S): at 20:15

## 2019-04-14 NOTE — ED ADULT NURSE NOTE - NS ED NURSE DC INFO COMPLEXITY
Straightforward: Basic instructions, no meds, no home treatment/Patient asked questions/Returned Demonstration/Verbalized Understanding

## 2019-04-14 NOTE — ED PROVIDER NOTE - PHYSICAL EXAMINATION
Constitutional: Well appearing. No acute distress. Non toxic.   Eyes: PERRLA. Extraocular movements intact, no entrapment. Conjunctiva normal.   ENT: No nasal discharge. dry mucus membranes.  Neck: Supple, non tender, full range of motion.  CV: regular, tachycardic no murmurs, rubs, or gallops. +S1S2.   Pulm: Clear to auscultation bilaterally. Normal work of breathing.  Abd: soft NT ND +BS.   Ext: Warm and well perfused x4, moving all extremities, no edema.   Psy: Cooperative, appropriate.   Skin: Warm, dry, no rash  Neuro: CN2-12 grossly intact no sensory or motor deficits throughout

## 2019-04-14 NOTE — ED PROVIDER NOTE - OBJECTIVE STATEMENT
23yo F  9wks pregnant with confirmed IUP followed by Dr. Bryant, anxiety, presenting with nausea/vomiting x9wks getting progressively worse to today- per pt, vomited 10x in last 24hrs, non-bloody, non-bilious. Unable to tolerate PO, +lightheaded. Feels like her heart is racing. +abd cramping. No fevers/chills, cough, congestion, rhinorrhea, chest pain, shortness of breath, dysuria/hematuria, back pain, numbness/focal weakness. Has seen her OB, she's been trying Trimethobenzamide with minimal relief. No vaginal bleeding/discharge.

## 2019-04-14 NOTE — ED PROVIDER NOTE - NS ED ROS FT
Constitutional:  See HPI.   Eyes:  No visual changes, eye pain or discharge.  ENMT:  No hearing changes, pain, discharge or infections. No neck pain or stiffness.  Cardiac:  No chest pain, SOB or edema. No chest pain with exertion.  Respiratory:  No cough or respiratory distress. No hemoptysis.  GI:  No  diarrhea   :  No dysuria, frequency, hematuria  MS:  No joint pain or back pain.  Neuro:  No LOC. No  weakness.    Skin:  No skin rash.  Except as in HPI, all other review of systems is negative

## 2019-04-14 NOTE — ED PROVIDER NOTE - CLINICAL SUMMARY MEDICAL DECISION MAKING FREE TEXT BOX
Patient received fluids, zofran and is now PO tolerant for both solids and liquids, VS now normal. Labs without significant dehydration, has ketosis in urine only but no significant AG or electrolyte abnormalities.     Patient feeling much better, BF is at bedside and she would like to go home with him. Has scheduled follow up with Dr. Bryant.

## 2019-04-14 NOTE — ED ADULT NURSE NOTE - CHIEF COMPLAINT QUOTE
Pt with PMH of anxiety states she is 9 weeks pregnant complaining of nausea and vomiting. denies vaginal bleeding. Pt extremely anxious in triage. tachycardia noted.  EKG preformed. evaluated by MD Castro.

## 2019-04-14 NOTE — ED ADULT NURSE NOTE - OBJECTIVE STATEMENT
Pt 9 weeks pregnant reports nausea, vomting, unable to tolerate foods, sob, anxious with chest pains. 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 Has stopped her anti anxiety medications because of being pregnant. First pregnancy per patient

## 2019-04-14 NOTE — ED PROVIDER NOTE - PROGRESS NOTE DETAILS
Pt feeling improved, tolerating PO, abdomen soft, non-tender, non-distended, no rebound, no guarding. Patient and family aware of all results, given a copy of all results, comfortable with discharge and follow-up outpatient, strict return precautions given. Patient endorses understanding of all of this and aware that they can return at any time for new or concerning symptoms. No further questions or concerns at this time

## 2019-04-14 NOTE — ED ADULT TRIAGE NOTE - CHIEF COMPLAINT QUOTE
Pt  with PMH of anxiety is 9 weeks pregnant complaining of nausea and vomiting. denies vaginal bleeding. Pt with PMH of anxiety states she is 9 weeks pregnant complaining of nausea and vomiting. denies vaginal bleeding. Pt extremely anxious in triage. tachycardia noted.  EKG preformed. evaluated by MD Castro.

## 2019-04-14 NOTE — ED PROVIDER NOTE - ATTENDING CONTRIBUTION TO CARE
25 yo F, history of anxiety,  9 weeks pregnant by LMP with confirmed IUP by US at OB/GYN Dr. Bryant's office, here for assessment of persistent nausea, vomiting and generalized weakness. Patient states she is unable to tolerate any PO with 10 episode of NBNB vomiting over last day. NO vaginal bleeding, discharge, dysuria, + cramping.    VS notable for , not that high on auscultation, clear lungs, regular rhythm, soft, NT, ND abdomen.    Will get EKG, check labs, hydrate, give zofran, bedside US and reassess

## 2019-04-17 ENCOUNTER — MESSAGE (OUTPATIENT)
Age: 24
End: 2019-04-17

## 2019-04-18 ENCOUNTER — OUTPATIENT (OUTPATIENT)
Dept: OUTPATIENT SERVICES | Facility: HOSPITAL | Age: 24
LOS: 1 days | Discharge: HOME | End: 2019-04-18

## 2019-04-18 ENCOUNTER — APPOINTMENT (OUTPATIENT)
Dept: OBGYN | Facility: CLINIC | Age: 24
End: 2019-04-18

## 2019-04-18 ENCOUNTER — NON-APPOINTMENT (OUTPATIENT)
Age: 24
End: 2019-04-18

## 2019-04-18 ENCOUNTER — RESULT CHARGE (OUTPATIENT)
Age: 24
End: 2019-04-18

## 2019-04-18 VITALS
DIASTOLIC BLOOD PRESSURE: 80 MMHG | BODY MASS INDEX: 26.82 KG/M2 | HEIGHT: 65 IN | SYSTOLIC BLOOD PRESSURE: 110 MMHG | WEIGHT: 161 LBS

## 2019-04-18 DIAGNOSIS — F17.200 NICOTINE DEPENDENCE, UNSPECIFIED, UNCOMPLICATED: ICD-10-CM

## 2019-04-18 DIAGNOSIS — Z00.00 ENCOUNTER FOR GENERAL ADULT MEDICAL EXAMINATION W/OUT ABNORMAL FINDINGS: ICD-10-CM

## 2019-04-18 DIAGNOSIS — F32.9 MAJOR DEPRESSIVE DISORDER, SINGLE EPISODE, UNSPECIFIED: ICD-10-CM

## 2019-04-18 DIAGNOSIS — F41.9 ANXIETY DISORDER, UNSPECIFIED: ICD-10-CM

## 2019-04-18 DIAGNOSIS — Z82.61 FAMILY HISTORY OF ARTHRITIS: ICD-10-CM

## 2019-04-18 RX ORDER — PNV NO.95/FERROUS FUM/FOLIC AC 28MG-0.8MG
TABLET ORAL
Refills: 0 | Status: ACTIVE | COMMUNITY

## 2019-04-19 DIAGNOSIS — Z34.81 ENCOUNTER FOR SUPERVISION OF OTHER NORMAL PREGNANCY, FIRST TRIMESTER: ICD-10-CM

## 2019-04-21 DIAGNOSIS — O21.9 VOMITING OF PREGNANCY, UNSPECIFIED: ICD-10-CM

## 2019-04-21 RX ORDER — METOCLOPRAMIDE 5 MG/1
5 TABLET ORAL
Qty: 20 | Refills: 0 | Status: ACTIVE | COMMUNITY
Start: 2019-04-21 | End: 1900-01-01

## 2019-04-22 LAB
BILIRUB UR QL STRIP: NORMAL
CLARITY UR: CLEAR
COLLECTION METHOD: NORMAL
GLUCOSE UR-MCNC: NORMAL
HCG UR QL: 4 EU/DL
HGB UR QL STRIP.AUTO: NORMAL
KETONES UR-MCNC: NORMAL
LEUKOCYTE ESTERASE UR QL STRIP: NORMAL
NITRITE UR QL STRIP: NORMAL
PH UR STRIP: 6.5
PROT UR STRIP-MCNC: NORMAL
SP GR UR STRIP: 1.02

## 2019-04-24 ENCOUNTER — EMERGENCY (EMERGENCY)
Facility: HOSPITAL | Age: 24
LOS: 0 days | Discharge: HOME | End: 2019-04-24
Attending: EMERGENCY MEDICINE | Admitting: EMERGENCY MEDICINE
Payer: MEDICAID

## 2019-04-24 VITALS
OXYGEN SATURATION: 99 % | RESPIRATION RATE: 18 BRPM | TEMPERATURE: 98 F | HEART RATE: 84 BPM | DIASTOLIC BLOOD PRESSURE: 68 MMHG | SYSTOLIC BLOOD PRESSURE: 117 MMHG

## 2019-04-24 VITALS
TEMPERATURE: 98 F | SYSTOLIC BLOOD PRESSURE: 122 MMHG | OXYGEN SATURATION: 100 % | DIASTOLIC BLOOD PRESSURE: 65 MMHG | RESPIRATION RATE: 18 BRPM | HEART RATE: 130 BPM

## 2019-04-24 DIAGNOSIS — Z79.2 LONG TERM (CURRENT) USE OF ANTIBIOTICS: ICD-10-CM

## 2019-04-24 DIAGNOSIS — O21.9 VOMITING OF PREGNANCY, UNSPECIFIED: ICD-10-CM

## 2019-04-24 DIAGNOSIS — Z79.52 LONG TERM (CURRENT) USE OF SYSTEMIC STEROIDS: ICD-10-CM

## 2019-04-24 DIAGNOSIS — Z3A.11 11 WEEKS GESTATION OF PREGNANCY: ICD-10-CM

## 2019-04-24 DIAGNOSIS — Z79.1 LONG TERM (CURRENT) USE OF NON-STEROIDAL ANTI-INFLAMMATORIES (NSAID): ICD-10-CM

## 2019-04-24 DIAGNOSIS — J45.909 UNSPECIFIED ASTHMA, UNCOMPLICATED: ICD-10-CM

## 2019-04-24 DIAGNOSIS — Z79.51 LONG TERM (CURRENT) USE OF INHALED STEROIDS: ICD-10-CM

## 2019-04-24 DIAGNOSIS — F17.200 NICOTINE DEPENDENCE, UNSPECIFIED, UNCOMPLICATED: ICD-10-CM

## 2019-04-24 LAB
ALBUMIN SERPL ELPH-MCNC: 4.7 G/DL — SIGNIFICANT CHANGE UP (ref 3.5–5.2)
ALP SERPL-CCNC: 79 U/L — SIGNIFICANT CHANGE UP (ref 30–115)
ALT FLD-CCNC: 9 U/L — SIGNIFICANT CHANGE UP (ref 0–41)
ANION GAP SERPL CALC-SCNC: 20 MMOL/L — HIGH (ref 7–14)
APPEARANCE UR: ABNORMAL
AST SERPL-CCNC: 12 U/L — SIGNIFICANT CHANGE UP (ref 0–41)
BACTERIA # UR AUTO: ABNORMAL /HPF
BASOPHILS # BLD AUTO: 0.02 K/UL — SIGNIFICANT CHANGE UP (ref 0–0.2)
BASOPHILS NFR BLD AUTO: 0.2 % — SIGNIFICANT CHANGE UP (ref 0–1)
BILIRUB SERPL-MCNC: 0.5 MG/DL — SIGNIFICANT CHANGE UP (ref 0.2–1.2)
BILIRUB UR-MCNC: ABNORMAL
BUN SERPL-MCNC: 12 MG/DL — SIGNIFICANT CHANGE UP (ref 10–20)
CALCIUM SERPL-MCNC: 10.1 MG/DL — SIGNIFICANT CHANGE UP (ref 8.5–10.1)
CHLORIDE SERPL-SCNC: 100 MMOL/L — SIGNIFICANT CHANGE UP (ref 98–110)
CO2 SERPL-SCNC: 18 MMOL/L — SIGNIFICANT CHANGE UP (ref 17–32)
COLOR SPEC: SIGNIFICANT CHANGE UP
CREAT SERPL-MCNC: 0.6 MG/DL — LOW (ref 0.7–1.5)
DIFF PNL FLD: ABNORMAL
EOSINOPHIL # BLD AUTO: 0.01 K/UL — SIGNIFICANT CHANGE UP (ref 0–0.7)
EOSINOPHIL NFR BLD AUTO: 0.1 % — SIGNIFICANT CHANGE UP (ref 0–8)
EPI CELLS # UR: ABNORMAL /HPF
GLUCOSE SERPL-MCNC: 77 MG/DL — SIGNIFICANT CHANGE UP (ref 70–99)
GLUCOSE UR QL: NEGATIVE — SIGNIFICANT CHANGE UP
HCT VFR BLD CALC: 45 % — SIGNIFICANT CHANGE UP (ref 37–47)
HGB BLD-MCNC: 15.6 G/DL — SIGNIFICANT CHANGE UP (ref 12–16)
IMM GRANULOCYTES NFR BLD AUTO: 0.2 % — SIGNIFICANT CHANGE UP (ref 0.1–0.3)
KETONES UR-MCNC: >=80
LACTATE SERPL-SCNC: 1 MMOL/L — SIGNIFICANT CHANGE UP (ref 0.5–2.2)
LEUKOCYTE ESTERASE UR-ACNC: ABNORMAL
LIDOCAIN IGE QN: 45 U/L — SIGNIFICANT CHANGE UP (ref 7–60)
LYMPHOCYTES # BLD AUTO: 1.81 K/UL — SIGNIFICANT CHANGE UP (ref 1.2–3.4)
LYMPHOCYTES # BLD AUTO: 18.4 % — LOW (ref 20.5–51.1)
MCHC RBC-ENTMCNC: 29 PG — SIGNIFICANT CHANGE UP (ref 27–31)
MCHC RBC-ENTMCNC: 34.7 G/DL — SIGNIFICANT CHANGE UP (ref 32–37)
MCV RBC AUTO: 83.6 FL — SIGNIFICANT CHANGE UP (ref 81–99)
MONOCYTES # BLD AUTO: 0.66 K/UL — HIGH (ref 0.1–0.6)
MONOCYTES NFR BLD AUTO: 6.7 % — SIGNIFICANT CHANGE UP (ref 1.7–9.3)
NEUTROPHILS # BLD AUTO: 7.31 K/UL — HIGH (ref 1.4–6.5)
NEUTROPHILS NFR BLD AUTO: 74.4 % — SIGNIFICANT CHANGE UP (ref 42.2–75.2)
NITRITE UR-MCNC: NEGATIVE — SIGNIFICANT CHANGE UP
NRBC # BLD: 0 /100 WBCS — SIGNIFICANT CHANGE UP (ref 0–0)
PH UR: 6 — SIGNIFICANT CHANGE UP (ref 5–8)
PLATELET # BLD AUTO: 301 K/UL — SIGNIFICANT CHANGE UP (ref 130–400)
POTASSIUM SERPL-MCNC: 4 MMOL/L — SIGNIFICANT CHANGE UP (ref 3.5–5)
POTASSIUM SERPL-SCNC: 4 MMOL/L — SIGNIFICANT CHANGE UP (ref 3.5–5)
PROT SERPL-MCNC: 7 G/DL — SIGNIFICANT CHANGE UP (ref 6–8)
PROT UR-MCNC: 30
RBC # BLD: 5.38 M/UL — SIGNIFICANT CHANGE UP (ref 4.2–5.4)
RBC # FLD: 12.2 % — SIGNIFICANT CHANGE UP (ref 11.5–14.5)
RBC CASTS # UR COMP ASSIST: ABNORMAL /HPF
SODIUM SERPL-SCNC: 138 MMOL/L — SIGNIFICANT CHANGE UP (ref 135–146)
SP GR SPEC: >=1.03 — SIGNIFICANT CHANGE UP (ref 1.01–1.03)
UROBILINOGEN FLD QL: 1 (ref 0.2–0.2)
WBC # BLD: 9.83 K/UL — SIGNIFICANT CHANGE UP (ref 4.8–10.8)
WBC # FLD AUTO: 9.83 K/UL — SIGNIFICANT CHANGE UP (ref 4.8–10.8)
WBC UR QL: SIGNIFICANT CHANGE UP /HPF

## 2019-04-24 PROCEDURE — 99284 EMERGENCY DEPT VISIT MOD MDM: CPT

## 2019-04-24 RX ORDER — SODIUM CHLORIDE 9 MG/ML
1000 INJECTION INTRAMUSCULAR; INTRAVENOUS; SUBCUTANEOUS ONCE
Qty: 0 | Refills: 0 | Status: COMPLETED | OUTPATIENT
Start: 2019-04-24 | End: 2019-04-24

## 2019-04-24 RX ORDER — ONDANSETRON 8 MG/1
4 TABLET, FILM COATED ORAL ONCE
Qty: 0 | Refills: 0 | Status: COMPLETED | OUTPATIENT
Start: 2019-04-24 | End: 2019-04-24

## 2019-04-24 RX ORDER — SODIUM CHLORIDE 9 MG/ML
1000 INJECTION, SOLUTION INTRAVENOUS
Qty: 0 | Refills: 0 | Status: DISCONTINUED | OUTPATIENT
Start: 2019-04-24 | End: 2019-04-24

## 2019-04-24 RX ADMIN — ONDANSETRON 4 MILLIGRAM(S): 8 TABLET, FILM COATED ORAL at 17:00

## 2019-04-24 RX ADMIN — SODIUM CHLORIDE 1000 MILLILITER(S): 9 INJECTION INTRAMUSCULAR; INTRAVENOUS; SUBCUTANEOUS at 17:00

## 2019-04-24 RX ADMIN — SODIUM CHLORIDE 1000 MILLILITER(S): 9 INJECTION, SOLUTION INTRAVENOUS at 18:09

## 2019-04-24 NOTE — ED ADULT NURSE NOTE - OBJECTIVE STATEMENT
Patient reports she is 11 weeks pregnant and have had constant vomiting since Sunday with no relief. denies any vaginal discharge or cramping.

## 2019-04-24 NOTE — ED PROVIDER NOTE - ATTENDING CONTRIBUTION TO CARE
24 A2 female 11 wks pregnant with vomiting since last night, multiple epsides NBNB, no diarhea, similar to vomiting in the past. reglan is not stopping the nausea, no abd pain. us nml in the past. on exam abd soft, lungs cta, mmm dry. imp: dehydration, will give antiemetics, hydrate, check labs.

## 2019-04-24 NOTE — ED PROVIDER NOTE - OBJECTIVE STATEMENT
24 A2 female 11 wks pregnant presents to the ED for evaluation of vomiting.  States that she has had multiple episodes of NBNB emesis since last night.  Not able to tolerate PO today prompting visit to the ED.  Denies abd pain, cramping, vaginal d/c or bleeding, urinary sxs, chest pain, dyspnea, fever, chills.

## 2019-04-24 NOTE — ED PROVIDER NOTE - PROGRESS NOTE DETAILS
feels improved.  states " I feel hungry." tolerating food without difficulty.  asx.  no abd pain.  Discussed results with pt.  All questions were answered and return precautions discussed.  Pt is asx and comfortable at this time.  Unremarkable re-exam.  No further concerns at this time from pt.  Will follow up with PMD and OB/Gyn.  Pt understands and agrees with tx plan.  extensive discussion about smoking cessation with pt.  Will speak with OB about alternatives.  Not interested at this time.  Discussed risks of fetal harms.  understands risks. repeat fhr 150s

## 2019-04-24 NOTE — ED PROVIDER NOTE - CLINICAL SUMMARY MEDICAL DECISION MAKING FREE TEXT BOX
evaluate for nausea and vomiting, labs were nml, FHR nml, patien twas hydrated, eventually she was tolering po and able to eat a full sandwich with apple juice.

## 2019-04-24 NOTE — ED ADULT NURSE NOTE - NSIMPLEMENTINTERV_GEN_ALL_ED
Implemented All Universal Safety Interventions:  [place] to call system. Call bell, personal items and telephone within reach. Instruct patient to call for assistance. Room bathroom lighting operational. Non-slip footwear when patient is off stretcher. Physically safe environment: no spills, clutter or unnecessary equipment. Stretcher in lowest position, wheels locked, appropriate side rails in place.

## 2019-04-24 NOTE — ED ADULT TRIAGE NOTE - CHIEF COMPLAINT QUOTE
+ 11 week pregnant here for vomiting . denies fevers/ no vaginal bleeding. seen here 4/14 for same symptoms. Ate Chinese food unable to tolerate .

## 2019-04-24 NOTE — ED PROVIDER NOTE - NSFOLLOWUPINSTRUCTIONS_ED_ALL_ED_FT
Nausea / Vomiting    Nausea is the feeling that you have to vomit. As nausea gets worse, it can lead to vomiting. Vomiting puts you at an increased risk for dehydration. Older adults and people with other diseases or a weak immune system are at higher risk for dehydration. Drink clear fluids in small but frequent amounts as tolerated. Eat bland, easy-to-digest foods in small amounts as tolerated.    SEEK IMMEDIATE MEDICAL CARE IF YOU HAVE ANY OF THE FOLLOWING SYMPTOMS: fever, inability to keep sufficient fluids down, black or bloody vomitus, black or bloody stools, lightheadedness/dizziness, chest pain, severe headache, rash, shortness of breath, cold or clammy skin, confusion, pain with urination, or any signs of dehydration.  Follow up with your primary medical doctor in 1-2 days  Follow up with your OB in 1-2 days

## 2019-04-24 NOTE — ED PROVIDER NOTE - CARE PROVIDER_API CALL
Bridgett Thomas)  OBSGYN  Physicians  [address] [place] [address]  Phone: [phone]  Fax: [phone]  Follow Up Time: 1-3 Days

## 2019-04-24 NOTE — ED PROVIDER NOTE - NS ED ROS FT
Constitutional: See HPI.  Eyes: No visual changes, eye pain or discharge.  ENMT: No hearing changes, pain, discharge or infections.   Cardiac: No SOB or edema. No chest pain with exertion.  Respiratory: No cough or respiratory distress.   GI: + nausea, + vomiting. No diarrhea or abdominal pain.  : No dysuria, frequency or burning. No Discharge  MS: No myalgia, muscle weakness, joint pain or back pain.  Neuro: No headache or weakness  Skin: No skin rash.  Except as documented in the HPI, all other systems are negative.

## 2019-04-24 NOTE — ED PROVIDER NOTE - PHYSICAL EXAMINATION
CONST: Well appearing in NAD  EYES:  Sclera and conjunctiva clear.  CARD: Normal S1 S2; Normal rate and rhythm  RESP: Equal BS B/L, No wheezes, rhonchi or rales. No distress  GI: Soft, non-tender, non-distended, FHR 170s  MS: Normal ROM in all extremities. No edema of lower extremities, no calf pain, radial pulses 2+ bilaterally  SKIN: Warm, dry, no acute rashes. Good turgor  NEURO: A&Ox3, No focal deficits. Strength 5/5 with no sensory deficits. Steady gait

## 2019-04-24 NOTE — ED PROVIDER NOTE - NSFOLLOWUPCLINICS_GEN_ALL_ED_FT
[facility]  OB/GYN  [address] [place] [address]  Phone: [phone]  Fax:   Follow Up Time: 1-3 Days

## 2019-04-25 LAB
CULTURE RESULTS: SIGNIFICANT CHANGE UP
SPECIMEN SOURCE: SIGNIFICANT CHANGE UP

## 2019-05-01 ENCOUNTER — APPOINTMENT (OUTPATIENT)
Dept: ANTEPARTUM | Facility: CLINIC | Age: 24
End: 2019-05-01

## 2019-05-13 ENCOUNTER — EMERGENCY (EMERGENCY)
Facility: HOSPITAL | Age: 24
LOS: 0 days | Discharge: HOME | End: 2019-05-14
Attending: EMERGENCY MEDICINE | Admitting: EMERGENCY MEDICINE
Payer: MEDICAID

## 2019-05-13 VITALS
HEART RATE: 121 BPM | TEMPERATURE: 97 F | SYSTOLIC BLOOD PRESSURE: 103 MMHG | OXYGEN SATURATION: 98 % | RESPIRATION RATE: 20 BRPM | DIASTOLIC BLOOD PRESSURE: 72 MMHG | HEIGHT: 65 IN | WEIGHT: 139.99 LBS

## 2019-05-13 DIAGNOSIS — F41.8 OTHER SPECIFIED ANXIETY DISORDERS: ICD-10-CM

## 2019-05-13 DIAGNOSIS — Z3A.13 13 WEEKS GESTATION OF PREGNANCY: ICD-10-CM

## 2019-05-13 DIAGNOSIS — O21.0 MILD HYPEREMESIS GRAVIDARUM: ICD-10-CM

## 2019-05-13 DIAGNOSIS — E86.0 DEHYDRATION: ICD-10-CM

## 2019-05-13 DIAGNOSIS — Z79.51 LONG TERM (CURRENT) USE OF INHALED STEROIDS: ICD-10-CM

## 2019-05-13 DIAGNOSIS — Z79.2 LONG TERM (CURRENT) USE OF ANTIBIOTICS: ICD-10-CM

## 2019-05-13 DIAGNOSIS — Z79.52 LONG TERM (CURRENT) USE OF SYSTEMIC STEROIDS: ICD-10-CM

## 2019-05-13 DIAGNOSIS — Z87.891 PERSONAL HISTORY OF NICOTINE DEPENDENCE: ICD-10-CM

## 2019-05-13 DIAGNOSIS — J45.909 UNSPECIFIED ASTHMA, UNCOMPLICATED: ICD-10-CM

## 2019-05-13 DIAGNOSIS — Z79.899 OTHER LONG TERM (CURRENT) DRUG THERAPY: ICD-10-CM

## 2019-05-13 DIAGNOSIS — R10.9 UNSPECIFIED ABDOMINAL PAIN: ICD-10-CM

## 2019-05-13 PROCEDURE — 76815 OB US LIMITED FETUS(S): CPT | Mod: 26

## 2019-05-13 PROCEDURE — 99284 EMERGENCY DEPT VISIT MOD MDM: CPT | Mod: 25

## 2019-05-13 RX ORDER — SODIUM CHLORIDE 9 MG/ML
2000 INJECTION, SOLUTION INTRAVENOUS ONCE
Refills: 0 | Status: DISCONTINUED | OUTPATIENT
Start: 2019-05-13 | End: 2019-05-14

## 2019-05-14 VITALS — HEART RATE: 98 BPM

## 2019-05-14 RX ORDER — ONDANSETRON 8 MG/1
1 TABLET, FILM COATED ORAL
Qty: 20 | Refills: 0
Start: 2019-05-14

## 2019-05-14 RX ORDER — ONDANSETRON 8 MG/1
4 TABLET, FILM COATED ORAL ONCE
Refills: 0 | Status: COMPLETED | OUTPATIENT
Start: 2019-05-14 | End: 2019-05-14

## 2019-05-14 RX ORDER — ONDANSETRON 8 MG/1
4 TABLET, FILM COATED ORAL ONCE
Refills: 0 | Status: DISCONTINUED | OUTPATIENT
Start: 2019-05-14 | End: 2019-05-14

## 2019-05-14 RX ADMIN — ONDANSETRON 4 MILLIGRAM(S): 8 TABLET, FILM COATED ORAL at 00:35

## 2019-05-14 NOTE — ED PROVIDER NOTE - PHYSICAL EXAMINATION
Physical Exam  General: Awake, alert, NAD, WDWN, non-toxic appearing, NCAT  Eyes: PERRL, EOMI, no icterus, lids and conjunctivae are normal  ENT: External inspection normal, pink/dry membranes, pharynx normal  CV: S1S2, regular rate and rhythm, no murmur/gallops/rubs, no JVD, 2+ pulses b/l, no edema/cords/homans, warm/well-perfused  Respiratory: Normal respiratory rate/effort, no respiratory distress, normal voice, speaking full sentences, lungs clear to auscultation b/l, no wheezing/rales/rhonchi, no retractions, no stridor  Abdomen: Soft abdomen, no tender/distended/guarding/rebound, no CVA tender  Musculoskeletal: FROM all 4 extremities, N/V intact, stable gait  Neck: FROM neck, supple, no meningismus, trachea midline, no JVD  Integumentary: Color normal for race, warm and dry, no rash  Neuro: Oriented x3, CN 2-12 grossly intact, normal motor, normal sensory  Psych: Oriented x3, mood normal, affect normal

## 2019-05-14 NOTE — ED PROVIDER NOTE - NS ED ROS FT
Review of Systems  Constitutional:  No fever or chills.   Eyes:  Negative.   ENMT:  No nasal congestion, discharge, or throat pain.   Cardiac:  No chest pain, syncope, or edema.  Respiratory:  No dyspnea, wheezing, or cough. No hemoptysis.  GI:  No diarrhea or abdominal pain. No melena or hematochezia.  :  No dysuria or hematuria. No vaginal bleeding/discharge.  Musculoskeletal:  No joint swelling, joint pain, or back pain.  Skin:  No skin rash, jaundice, or lesions.  Neuro:  No headache, loss of sensation, or focal weakness.  No change in mental status.

## 2019-05-14 NOTE — ED PROVIDER NOTE - PROGRESS NOTE DETAILS
Fetal  on bedside US. +fetal movement Pt declining lab checks and reports that she has Gyn appointment in am. Pt declining IV fluids. Pt tolerating PO intake in ED. Pt declining all interventions and requesting prescription for antiemetics only.

## 2019-05-14 NOTE — ED PROVIDER NOTE - CARE PLAN
Principal Discharge DX:	Hyperemesis gravidarum Principal Discharge DX:	Hyperemesis gravidarum  Assessment and plan of treatment:	24f pregnant w vomiting and mild dehydration. nontoxic appearing, n/v intact, no abd pain/tender. --Labs, IV fluids, antiemetics as needed, observe/re-assess.

## 2019-05-14 NOTE — ED PROVIDER NOTE - CLINICAL SUMMARY MEDICAL DECISION MAKING FREE TEXT BOX
24f pregnant w vomiting and mild dehydration. nontoxic appearing, n/v intact, no abd pain/tender. Pt declining lab check. Pt declining IV fluids. Pt tolerating PO intake in ED. Pt declining all interventions and requesting prescription for antiemetics only. Pt advised regarding symptomatic/supportive care, importance of OB f/u, and symptoms to prompt ED return.

## 2019-05-14 NOTE — ED PROVIDER NOTE - OBJECTIVE STATEMENT
24f w a hx of anxiety/depression,  @ 13wk 6day w LMP 19. Pt presents w several weeks of vomiting (no blood/bile). Symptoms are moderate, intermittent, no exacerbating/alleviating. Pt seen in ED for similar previously. No diarrhea, no black/bloody stools, no abdominal/pelvic pain.

## 2019-05-14 NOTE — ED PROVIDER NOTE - NSFOLLOWUPCLINICS_GEN_ALL_ED_FT
An OB/GYN physician  Obstetrics & Gynecology  .  [place]   Phone:   Fax:   Follow Up Time: 1-3 Days    [facility]  OB/GYN  [address] [place] [address]  Phone: [phone]  Fax:   Follow Up Time: 1-3 Days

## 2019-05-14 NOTE — ED PROVIDER NOTE - NSFOLLOWUPINSTRUCTIONS_ED_ALL_ED_FT
Return to the ER for worsening or concerning symptoms.    See printed discharge information sheets for further instructions on care for your condition.    Hyperemesis Gravidarum  Hyperemesis gravidarum is a severe form of nausea and vomiting that happens during pregnancy. Hyperemesis is worse than morning sickness. It may cause you to have nausea or vomiting all day for many days. It may keep you from eating and drinking enough food and liquids, which can lead to dehydration, malnutrition, and weight loss. Hyperemesis usually occurs during the first half (the first 20 weeks) of pregnancy. It often goes away once a woman is in her second half of pregnancy. However, sometimes hyperemesis continues through an entire pregnancy.    What are the causes?  The cause of this condition is not known. It may be related to changes in chemicals (hormones) in the body during pregnancy, such as the high level of pregnancy hormone (human chorionic gonadotropin) or the increase in the female sex hormone (estrogen).    What are the signs or symptoms?  Symptoms of this condition include:  Nausea that does not go away.  Vomiting that does not allow you to keep any food down.  Weight loss.  Body fluid loss (dehydration).  Having no desire to eat, or not liking food that you have previously enjoyed.  How is this diagnosed?  This condition may be diagnosed based on:  A physical exam.  Your medical history.  Your symptoms.  Blood tests.  Urine tests.  How is this treated?  This condition is managed by controlling symptoms. This may include:  Following an eating plan. This can help lessen nausea and vomiting.  Taking prescription medicines.  An eating plan and medicines are often used together to help control symptoms. If medicines do not help relieve nausea and vomiting, you may need to receive fluids through an IV at the hospital.    Follow these instructions at home:  Eating and drinking     Avoid the following:  Drinking fluids with meals. Try not to drink anything during the 30 minutes before and after your meals.  Drinking more than 1 cup of fluid at a time.  Eating foods that trigger your symptoms. These may include spicy foods, coffee, high-fat foods, very sweet foods, and acidic foods.  Skipping meals. Nausea can be more intense on an empty stomach. If you cannot tolerate food, do not force it. Try sucking on ice chips or other frozen items and make up for missed calories later.  Lying down within 2 hours after eating.  Being exposed to environmental triggers. These may include food smells, smoky rooms, closed spaces, rooms with strong smells, warm or humid places, overly loud and noisy rooms, and rooms with motion or flickering lights. Try eating meals in a well-ventilated area that is free of strong smells.  Quick and sudden changes in your movement.  Taking iron pills and multivitamins that contain iron. If you take prescription iron pills, do not stop taking them unless your health care provider approves.  Preparing food. The smell of food can spoil your appetite or trigger nausea.  To help relieve your symptoms:  Listen to your body. Everyone is different and has different preferences. Find what works best for you.  Eat and drink slowly.  Eat 5–6 small meals daily instead of 3 large meals. Eating small meals and snacks can help you avoid an empty stomach.  In the morning, before getting out of bed, eat a couple of crackers to avoid moving around on an empty stomach.  Try eating starchy foods as these are usually tolerated well. Examples include cereal, toast, bread, potatoes, pasta, rice, and pretzels.  Include at least 1 serving of protein with your meals and snacks. Protein options include lean meats, poultry, seafood, beans, nuts, nut butters, eggs, cheese, and yogurt.  Try eating a protein-rich snack before bed. Examples of a protein-ana lilia snack include cheese and crackers or a peanut butter sandwich made with 1 slice of whole-wheat bread and 1 tsp (5 g) of peanut butter.  Eat or suck on things that have ginger in them. It may help relieve nausea. Add ¼ tsp ground ginger to hot tea or choose ginger tea.  Try drinking 100% fruit juice or an electrolyte drink. An electrolyte drink contains sodium, potassium, and chloride.  Drink fluids that are cold, clear, and carbonated or sour. Examples include lemonade, ginger ale, lemon–lime soda, ice water, and sparkling water.  Brush your teeth or use a mouth rinse after meals.  Talk with your health care provider about starting a supplement of vitamin B6.  General instructions     Take over-the-counter and prescription medicines only as told by your health care provider.  Follow instructions from your health care provider about eating or drinking restrictions.  Continue to take your prenatal vitamins as told by your health care provider. If you are having trouble taking your prenatal vitamins, talk with your health care provider about different options.  Keep all follow-up and pre-birth (prenatal) visits as told by your health care provider. This is important.  Contact a health care provider if:  You have pain in your abdomen.  You have a severe headache.  You have vision problems.  You are losing weight.  You feel weak or dizzy.  Get help right away if:  You cannot drink fluids without vomiting.  You vomit blood.  You have constant nausea and vomiting.  You are very weak.  You faint.  You have a fever and your symptoms suddenly get worse.  Summary  Hyperemesis gravidarum is a severe form of nausea and vomiting that happens during pregnancy.  Making some changes to your eating habits may help relieve nausea and vomiting.  This condition may be managed with medicine.  If medicines do not help relieve nausea and vomiting, you may need to receive fluids through an IV at the hospital.  This information is not intended to replace advice given to you by your health care provider. Make sure you discuss any questions you have with your health care provider.

## 2019-05-14 NOTE — ED PROVIDER NOTE - PLAN OF CARE
24f pregnant w vomiting and mild dehydration. nontoxic appearing, n/v intact, no abd pain/tender. --Labs, IV fluids, antiemetics as needed, observe/re-assess.

## 2019-05-14 NOTE — ED ADULT NURSE REASSESSMENT NOTE - NS ED NURSE REASSESS COMMENT FT1
pt is refusing blood work, pt states she doesn't want labs or IV placed. does not want meds via IV. MD salazar made aware. Zofran ODT ordered and administered. PO fluids tolerated after med. will cont to monitor.

## 2019-05-16 ENCOUNTER — APPOINTMENT (OUTPATIENT)
Dept: OBGYN | Facility: CLINIC | Age: 24
End: 2019-05-16

## 2019-05-16 ENCOUNTER — NON-APPOINTMENT (OUTPATIENT)
Age: 24
End: 2019-05-16

## 2019-06-11 ENCOUNTER — OUTPATIENT (OUTPATIENT)
Dept: OUTPATIENT SERVICES | Facility: HOSPITAL | Age: 24
LOS: 1 days | Discharge: HOME | End: 2019-06-11
Payer: MEDICAID

## 2019-06-11 VITALS — SYSTOLIC BLOOD PRESSURE: 116 MMHG | HEART RATE: 107 BPM | DIASTOLIC BLOOD PRESSURE: 68 MMHG | TEMPERATURE: 99 F

## 2019-06-11 PROCEDURE — 99213 OFFICE O/P EST LOW 20 MIN: CPT | Mod: 25

## 2019-06-11 PROCEDURE — 76815 OB US LIMITED FETUS(S): CPT | Mod: 26

## 2019-06-11 PROCEDURE — 59025 FETAL NON-STRESS TEST: CPT | Mod: 26

## 2019-06-11 NOTE — OB PROVIDER TRIAGE NOTE - NSHPLABSRESULTS_GEN_ALL_CORE
3/13/19 Type and Screen: O pos  Antibody screen: neg   Rubella: non-immune  Varicella: immune   RPR: neg  HbSAg: neg  HIV: NR

## 2019-06-11 NOTE — OB PROVIDER TRIAGE NOTE - HISTORY OF PRESENT ILLNESS
23yo  at 17w6d by LMP c/w ?second trimester sono per patient, poorly compliant patient of Dr. Ceballos, presents to labor and delivery for leakage of fluid at 2300, clear, patient unsure if urine, described as gush on bathroom floor, report wetting her underwear. Associated with lower mid abdominal cramping radiating to the vagina, 8/10 at onset, now resolved. Denies vaginal bleeding. Reports being diagnosed with a UTI at PMD's and was prescribed antibiotics that she never picked up from pharmacy. Patient denies vaginal discharge, fevers, chills, dysuria, increased frequency or urgency, changes in bowel habits, extremity pain or swelling. Reports intermittent nausea and vomiting in the pregnancy. Denies other complications in this pregnancy. Last PO this afternoon. Last BM yesterday. Last intercourse yesterday. No sick contacts      Social: 13pack/year. Denies alcohol or illicit drug use  Allergies nkda  Medications none

## 2019-06-11 NOTE — OB PROVIDER TRIAGE NOTE - NS_OBGYNHISTORY_OBGYN_ALL_OB_FT
OBhx  etopx3 with d&cx3- all first trimester    GYNhx  hx of ASCUS, HPV neg in 2018  Denies fibroids, ovarian cysts, STIs

## 2019-06-11 NOTE — OB PROVIDER TRIAGE NOTE - NSHPPHYSICALEXAM_GEN_ALL_CORE
Physical exam:  Vital Signs Last 24 Hrs  T(F): 98.8 (11 Jun 2019 23:12), Max: 98.8 (11 Jun 2019 22:49)  HR: 107 (11 Jun 2019 23:12) (107 - 107)  BP: 116/68 (11 Jun 2019 23:12) (116/68 - 116/68)  RR: 18 (11 Jun 2019 23:12) (18 - 18)  Udip neg  Gen: NAD  Abdomen: Soft, nontender, gravid.  Speculum: cervix appears closed. No blood or active bleeding. No lesions seen. negaive for pooling, nitrazine or ferning  Sono: CL 2.8cm, vertex, post placenta, MVP 4cm, gross fetal movement seen, FH 159bpm  VE: cl/l/p

## 2019-06-11 NOTE — OB PROVIDER TRIAGE NOTE - NSOBPROVIDERNOTE_OBGYN_ALL_OB_FT
25yo  at 17w6d, GBS unknown, with urinary incontinence, rule out urinary tract infection, maternal and fetal status reassuring    -f/u UA, urine culture  -f/u at [facility]  -Macrobid 100mg BID for 5 days    Dr. Cunningham and Dr. Thomas aware

## 2019-06-12 VITALS — DIASTOLIC BLOOD PRESSURE: 59 MMHG | HEART RATE: 103 BPM | SYSTOLIC BLOOD PRESSURE: 101 MMHG

## 2019-06-12 DIAGNOSIS — Z98.890 OTHER SPECIFIED POSTPROCEDURAL STATES: Chronic | ICD-10-CM

## 2019-06-12 PROBLEM — F32.9 MAJOR DEPRESSIVE DISORDER, SINGLE EPISODE, UNSPECIFIED: Chronic | Status: ACTIVE | Noted: 2019-05-14

## 2019-06-12 LAB
APPEARANCE UR: ABNORMAL
BACTERIA # UR AUTO: ABNORMAL /HPF
BILIRUB UR-MCNC: ABNORMAL
COD CRY URNS QL: ABNORMAL /HPF
COLOR SPEC: SIGNIFICANT CHANGE UP
DIFF PNL FLD: NEGATIVE — SIGNIFICANT CHANGE UP
EPI CELLS # UR: ABNORMAL /HPF
GLUCOSE UR QL: NEGATIVE — SIGNIFICANT CHANGE UP
KETONES UR-MCNC: ABNORMAL
LEUKOCYTE ESTERASE UR-ACNC: NEGATIVE — SIGNIFICANT CHANGE UP
NITRITE UR-MCNC: NEGATIVE — SIGNIFICANT CHANGE UP
PH UR: 6 — SIGNIFICANT CHANGE UP (ref 5–8)
PROT UR-MCNC: ABNORMAL
SP GR SPEC: >=1.03 — SIGNIFICANT CHANGE UP (ref 1.01–1.03)
UROBILINOGEN FLD QL: 2 (ref 0.2–0.2)

## 2019-06-12 RX ORDER — NITROFURANTOIN MACROCRYSTAL 50 MG
1 CAPSULE ORAL
Qty: 10 | Refills: 0
Start: 2019-06-12 | End: 2019-06-16

## 2019-06-13 LAB
CULTURE RESULTS: SIGNIFICANT CHANGE UP
SPECIMEN SOURCE: SIGNIFICANT CHANGE UP

## 2019-07-28 ENCOUNTER — OUTPATIENT (OUTPATIENT)
Dept: OUTPATIENT SERVICES | Facility: HOSPITAL | Age: 24
LOS: 1 days | Discharge: HOME | End: 2019-07-28
Payer: MEDICAID

## 2019-07-28 VITALS — HEART RATE: 85 BPM | DIASTOLIC BLOOD PRESSURE: 56 MMHG | SYSTOLIC BLOOD PRESSURE: 98 MMHG

## 2019-07-28 VITALS
HEART RATE: 85 BPM | RESPIRATION RATE: 18 BRPM | TEMPERATURE: 98 F | DIASTOLIC BLOOD PRESSURE: 56 MMHG | SYSTOLIC BLOOD PRESSURE: 98 MMHG

## 2019-07-28 DIAGNOSIS — Z98.890 OTHER SPECIFIED POSTPROCEDURAL STATES: Chronic | ICD-10-CM

## 2019-07-28 PROBLEM — J45.909 UNSPECIFIED ASTHMA, UNCOMPLICATED: Chronic | Status: ACTIVE | Noted: 2018-07-09

## 2019-07-28 LAB
APPEARANCE UR: ABNORMAL
BACTERIA # UR AUTO: ABNORMAL
BILIRUB UR-MCNC: NEGATIVE — SIGNIFICANT CHANGE UP
COLOR SPEC: YELLOW — SIGNIFICANT CHANGE UP
DIFF PNL FLD: NEGATIVE — SIGNIFICANT CHANGE UP
EPI CELLS # UR: 12 /HPF — HIGH (ref 0–5)
GLUCOSE UR QL: NEGATIVE — SIGNIFICANT CHANGE UP
HYALINE CASTS # UR AUTO: 0 /LPF — SIGNIFICANT CHANGE UP (ref 0–7)
KETONES UR-MCNC: NEGATIVE — SIGNIFICANT CHANGE UP
LEUKOCYTE ESTERASE UR-ACNC: ABNORMAL
NITRITE UR-MCNC: NEGATIVE — SIGNIFICANT CHANGE UP
PH UR: 8.5 — HIGH (ref 5–8)
PROT UR-MCNC: SIGNIFICANT CHANGE UP
RBC CASTS # UR COMP ASSIST: 4 /HPF — SIGNIFICANT CHANGE UP (ref 0–4)
SP GR SPEC: 1.02 — SIGNIFICANT CHANGE UP (ref 1.01–1.02)
UROBILINOGEN FLD QL: SIGNIFICANT CHANGE UP
WBC UR QL: 6 /HPF — HIGH (ref 0–5)

## 2019-07-28 PROCEDURE — 99213 OFFICE O/P EST LOW 20 MIN: CPT | Mod: 25

## 2019-07-28 PROCEDURE — 59025 FETAL NON-STRESS TEST: CPT | Mod: 26

## 2019-07-28 NOTE — OB PROVIDER TRIAGE NOTE - HISTORY OF PRESENT ILLNESS
25yo , 24w4d dated by LMP, with reports of vaginal itching. She reports the itching started a few days ago and was evaluated at [facility]. She was given cephalexin, and the itching has since gotten worse. She complains today also of clitoral swelling. Denies ctx, abdominal pain, VB, LOF, and reports good FM. Denies complications this pregnancy. Denies CP, SOB, RUQ pain/ Epigastric pain, N/V, LE pain/ swelling, diarrhea, pain/difficulty with urination, fevers, chills. Last sexual intercourse 3 days ago. Last bowel movement 2 days ago. Schizophrenia per PMD. Per PMD, poor continuity of care.   Social: 13pack/year. Denies alcohol or illicit drug use   Allergies nkda  Medications none

## 2019-07-28 NOTE — OB PROVIDER TRIAGE NOTE - NSHPPHYSICALEXAM_GEN_ALL_CORE
Vital Signs Last 24 Hrs  T(C): 36.7 (28 Jul 2019 10:00), Max: 37.1 (28 Jul 2019 09:51)  T(F): 98 (28 Jul 2019 10:00), Max: 98.8 (28 Jul 2019 09:51)  HR: 85 (28 Jul 2019 10:00) (85 - 85)  BP: 98/56 (28 Jul 2019 10:00) (98/56 - 98/56)  RR: 18 (28 Jul 2019 10:00) (18 - 18)    FHR 165bpm/ mod mahamed/ accels+  TOCO none  SVE deferred  Speculum: Nitrazine negative, white discharge adherent noticed on vaginal wall   Cervix appeared closed   External genitalia appears normal   Sonogram: BPP 8/8, MVP 3.74cm, posterior placenta, vertex   UDIP not done    Previous sonogram on 6/11: CL 2.8cm, vertex, post placenta, MVP 4cm, gross fetal movement seen, FH 159bpm

## 2019-07-28 NOTE — OB PROVIDER TRIAGE NOTE - NS_OBGYNHISTORY_OBGYN_ALL_OB_FT
OBhx: etopx3 with d&cx3- all first trimester    GYNhx  hx of ASCUS, HPV neg in 2018  Denies fibroids, ovarian cysts, STIs

## 2019-07-28 NOTE — CHART NOTE - NSCHARTNOTEFT_GEN_A_CORE
Patient left AMA before full fetal evaluation was able to be completed.   Labor precautions given and Terconazole sent to pharmacy.

## 2019-07-28 NOTE — OB PROVIDER TRIAGE NOTE - NSOBPROVIDERNOTE_OBGYN_ALL_OB_FT
23yo , 24w4d dated by LMP, likely with yeast infection, s/p PO fluid hydration.   -IV Fluid hydration  -consider UA/ Urine culture  -f/u with Dr. Nelson on 19  -Terconazole sent to pharmacy

## 2019-07-28 NOTE — OB PROVIDER TRIAGE NOTE - NS_CHIEFCOMPLAINTOTHER_OBGYN_ALL_OB_FT
Joel Andrew is a 23 year old male presenting with headache, sore throat, cough, fatigue, body aches and dizziness that started yesterday.  Denies known Latex allergy or symptoms of Latex sensitivity.  Medications reviewed and updated.       vaginal itching

## 2019-07-29 LAB
AMPHET UR-MCNC: NEGATIVE — SIGNIFICANT CHANGE UP
BARBITURATES UR SCN-MCNC: NEGATIVE — SIGNIFICANT CHANGE UP
BENZODIAZ UR-MCNC: NEGATIVE — SIGNIFICANT CHANGE UP
BUPRENORPHINE SCREEN, URINE RESULT: NEGATIVE — SIGNIFICANT CHANGE UP
COCAINE METAB.OTHER UR-MCNC: NEGATIVE — SIGNIFICANT CHANGE UP
CULTURE RESULTS: SIGNIFICANT CHANGE UP
L&D DRUG SCREEN, URINE: SIGNIFICANT CHANGE UP
METHADONE UR-MCNC: NEGATIVE — SIGNIFICANT CHANGE UP
OPIATES UR-MCNC: NEGATIVE — SIGNIFICANT CHANGE UP
OXYCODONE UR-MCNC: NEGATIVE — SIGNIFICANT CHANGE UP
PCP UR-MCNC: NEGATIVE — SIGNIFICANT CHANGE UP
PROPOXYPHENE QUALITATIVE URINE RESULT: NEGATIVE — SIGNIFICANT CHANGE UP
SPECIMEN SOURCE: SIGNIFICANT CHANGE UP

## 2019-12-31 NOTE — ED ADULT TRIAGE NOTE - AS TEMP SITE
The patient has end stage arthritis of the left knee joint. The patient was seen and examined and there are no changes to the patient's orthopedic condition. They have tried conservative treatment for this condition; including antiinflammatories and lifestyle modifications and have failed. The necessity for the joint replacement is still present, and the H&P from the office is still current. The patient will be admitted today forProcedure(s) (LRB):  LEFT TOTAL KNEE ARTHROPLASTY/ DEPUY (Left) . oral

## 2021-11-21 ENCOUNTER — TRANSCRIPTION ENCOUNTER (OUTPATIENT)
Age: 26
End: 2021-11-21

## 2021-11-24 ENCOUNTER — APPOINTMENT (OUTPATIENT)
Dept: INTERNAL MEDICINE | Facility: CLINIC | Age: 26
End: 2021-11-24

## 2021-11-26 ENCOUNTER — TRANSCRIPTION ENCOUNTER (OUTPATIENT)
Age: 26
End: 2021-11-26

## 2022-01-06 ENCOUNTER — TRANSCRIPTION ENCOUNTER (OUTPATIENT)
Age: 27
End: 2022-01-06

## 2022-01-12 ENCOUNTER — TRANSCRIPTION ENCOUNTER (OUTPATIENT)
Age: 27
End: 2022-01-12

## 2022-03-11 ENCOUNTER — TRANSCRIPTION ENCOUNTER (OUTPATIENT)
Age: 27
End: 2022-03-11

## 2022-05-16 ENCOUNTER — EMERGENCY (EMERGENCY)
Facility: HOSPITAL | Age: 27
LOS: 0 days | Discharge: LEFT BEFORE DISCHARGE | End: 2022-05-16
Attending: EMERGENCY MEDICINE
Payer: MEDICAID

## 2022-05-16 VITALS
OXYGEN SATURATION: 98 % | HEART RATE: 114 BPM | WEIGHT: 203.05 LBS | DIASTOLIC BLOOD PRESSURE: 77 MMHG | TEMPERATURE: 99 F | SYSTOLIC BLOOD PRESSURE: 125 MMHG | RESPIRATION RATE: 18 BRPM

## 2022-05-16 DIAGNOSIS — Z98.890 OTHER SPECIFIED POSTPROCEDURAL STATES: Chronic | ICD-10-CM

## 2022-05-16 DIAGNOSIS — R10.31 RIGHT LOWER QUADRANT PAIN: ICD-10-CM

## 2022-05-16 DIAGNOSIS — Z53.29 PROCEDURE AND TREATMENT NOT CARRIED OUT BECAUSE OF PATIENT'S DECISION FOR OTHER REASONS: ICD-10-CM

## 2022-05-16 PROCEDURE — L9997: CPT

## 2022-05-16 NOTE — ED PROVIDER NOTE - NS ED ATTENDING STATEMENT MOD
This was a shared visit with the RONALD. I reviewed and verified the documentation and independently performed the documented:

## 2022-05-16 NOTE — ED PROVIDER NOTE - PROGRESS NOTE DETAILS
Pt refusing any lab work, refusing to give urine sample. Will obtain US As per RN pt seen leaving ed prior to attending eval or US. Pt eloped

## 2022-05-16 NOTE — ED PROVIDER NOTE - PHYSICAL EXAMINATION
CONSTITUTIONAL: Well-appearing; well-nourished; in no apparent distress.   EYES: PERRL; EOM intact.   ENT: normal nose; no rhinorrhea; normal pharynx with no tonsillar hypertrophy.   NECK: Supple; non-tender; no cervical lymphadenopathy.   CARDIOVASCULAR: Normal S1, S2; no murmurs, rubs, or gallops.   RESPIRATORY: Normal chest excursion with respiration; breath sounds clear and equal bilaterally; no wheezes, rhonchi, or rales.  GI/: Normal bowel sounds; non-distended; + diffuse abd ttp. No rebound or guarding. No cva ttp  MS: No evidence of trauma or deformity. Normal ROM in all four extremities; non-tender to palpation; distal pulses are normal.   SKIN: Normal for age and race; warm; dry; good turgor; no apparent lesions or exudate.   NEURO/PSYCH: A & O x 4; grossly unremarkable. mood and manner are appropriate. Grooming and personal hygiene are appropriate.

## 2022-05-16 NOTE — ED ADULT TRIAGE NOTE - ISOLATION TYPE:
Message  GI Reminder Recall [facility]:   Date: 02/01/2016   Dear Cherylene Corn:     Review of our records shows you are due for the following: Follow Up Visit  Please call the following office to schedule your appointment:   [address] [facility], [id number], [address] [place] [address]  [phone]  We look forward to hearing from you!      Sincerely,     Dr Yasmeen Smiley   Electronically signed by : Mara Colindres, ; Feb 1 2016  3:02PM EST                       (Author)
None

## 2022-05-16 NOTE — ED PROVIDER NOTE - ATTENDING APP SHARED VISIT CONTRIBUTION OF CARE
Patient was never seen by me he had a low after being seen by the physician assistant, multiple attempts to try to find patient and call patient with no response, patient left without being seen by attending.

## 2022-05-16 NOTE — ED ADULT NURSE NOTE - OBJECTIVE STATEMENT
Pt. came to ED c/o RLQ abdominal pain radiating to umbilicus x multiple years. C/o nausea x1 day, denies vomiting or diarrhea.

## 2022-05-16 NOTE — ED PROVIDER NOTE - OBJECTIVE STATEMENT
27 year old F sts has hx of abnormal pap spear 2019 was given "hpv shot" c/o rlq abd pain. Pt sts has had rlq pain since 08/21. Pain is sharp, constant without any exacerbating or relieving factors. pt denies any assoc vaginal discharge, abnl bleeding, fever/chills, nausea, vomiting, urinary symptoms, chest pain, sob. Pt is sexually active with one male. She denies any concern for sti and currently declines testing. Sts has not followed up with gyn since 2019.     Pt refusing any lab work and refusing to give ua.

## 2022-05-16 NOTE — ED PROVIDER NOTE - NS ED ROS FT
From: Lety Omer  To: Romelia Ponce MD  Sent: 11/27/2018 11:12 AM CST  Subject: Inr    Hi, were the inr results sent to Dr. Gera Auguste?  Thanks!  
Constitutional: no fever, chills, no recent weight loss, change in appetite or malaise  Eyes: no redness/discharge/pain/vision changes  ENT: no rhinorrhea/ear pain/sore throat  Cardiac: No chest pain, SOB or edema.  Respiratory: No cough or respiratory distress  GI: + rlq pain. No nausea, vomiting, diarrhea  : No dysuria, frequency, urgency or hematuria  MS: no pain to back or extremities, no loss of ROM, no weakness  Neuro: No headache or weakness. No LOC.  Skin: No skin rash.  Endocrine: No history of thyroid disease or diabetes.  Except as documented in the HPI, all other systems are negative.

## 2022-05-16 NOTE — ED ADULT NURSE NOTE - NSIMPLEMENTINTERV_GEN_ALL_ED
Implemented All Universal Safety Interventions:  [place] to call system. Call bell, personal items and telephone within reach. Instruct patient to call for assistance. Room bathroom lighting operational. Non-slip footwear when patient is off stretcher. Physically safe environment: no spills, clutter or unnecessary equipment. Stretcher in lowest position, wheels locked, appropriate side rails in place.

## 2022-12-06 NOTE — ED ADULT TRIAGE NOTE - MODE OF ARRIVAL
Called and spoke to patient to confirm their upcoming appointment with Dr Jaden Langston  Informed patient about arriving in the [place] location 15 minutes prior to their appointment to get checked in and going over chart  EMS

## 2023-01-17 ENCOUNTER — OUTPATIENT (OUTPATIENT)
Dept: OUTPATIENT SERVICES | Facility: HOSPITAL | Age: 28
LOS: 1 days | Discharge: HOME | End: 2023-01-17
Payer: MEDICAID

## 2023-01-17 DIAGNOSIS — Z98.890 OTHER SPECIFIED POSTPROCEDURAL STATES: Chronic | ICD-10-CM

## 2023-01-17 DIAGNOSIS — N97.9 FEMALE INFERTILITY, UNSPECIFIED: ICD-10-CM

## 2023-01-17 DIAGNOSIS — Q52.11 TRANSVERSE VAGINAL SEPTUM: ICD-10-CM

## 2023-01-17 PROCEDURE — 76856 US EXAM PELVIC COMPLETE: CPT | Mod: 26

## 2023-01-17 PROCEDURE — 76830 TRANSVAGINAL US NON-OB: CPT | Mod: 26

## 2023-03-03 NOTE — ED ADULT NURSE NOTE - CAS EDP DISCH TYPE
Number Of Freeze-Thaw Cycles: 1 freeze-thaw cycle Render Post-Care Instructions In Note?: no Detail Level: Detailed Consent: The patient's consent was obtained including but not limited to risks of crusting, scabbing, blistering, scarring, darker or lighter pigmentary change, recurrence, incomplete removal and infection. Show Aperture Variable?: Yes Duration Of Freeze Thaw-Cycle (Seconds): 0 Post-Care Instructions: I reviewed with the patient in detail post-care instructions. Patient is to wear sunprotection, and avoid picking at any of the treated lesions. Pt may apply Vaseline to crusted or scabbing areas. Home

## 2023-05-31 NOTE — ED PEDIATRIC TRIAGE NOTE - HEART RATE (BEATS/MIN)
Quality 431: Preventive Care And Screening: Unhealthy Alcohol Use - Screening: Patient screened for unhealthy alcohol use using a single question and scores less than 2 times per year
Detail Level: Detailed
Quality 130: Documentation Of Current Medications In The Medical Record: Current Medications Documented
Quality 474: Zoster Vaccination Status: Shingrix Vaccination not Administered or Previously Received, Reason not Otherwise Specified
Quality 226: Preventive Care And Screening: Tobacco Use: Screening And Cessation Intervention: Patient screened for tobacco use and is an ex/non-smoker
82

## 2023-08-04 NOTE — ED PROCEDURE NOTE - PROCEDURE DATE TIME, MLM
Name: Osman Dutton  YOB: 1963  Gender: female  MRN: [id number]      CC: Knee Pain (R)    Procedure Performed: R Knee Arthroscopy, Partial Lateral and Medial Menisectomy, Chondroplasty        Date of Procedure: 06/14/2023    HPI: Osman Dutton is a 63 y.o. female who is here for he 8 week follow-up post surgery. She is also here today for her first round of Euflexxa injections in her right knee. She reports that some days are better than others, but is progressing accordingly. She is having some difficulties sleeping, but has learned to sleep on her left side. Physical Examination:  General: no acute distress  right Knee: Exam is unchanged, the knee continues to be painful with palpation and range of motion. There is no effusion or concern for infection. Assessment:   1. Primary osteoarthritis of right knee         Plan:     Right knee injected from a anterior lateral approach with 2 mL Euflexxa viscosupplementation after sterile prep. Patient tolerated the procedure well was given postinjection flare precautions. Follow up one week for second round of injections        Carlos Claros MD, [address] [facility] 13-Jul-2018 17:48

## 2024-07-15 NOTE — ED ADULT NURSE NOTE - NS ED NURSE DC PT EDUCATION RESOURCES
While in the ER, we gave you some IV fluids and checked your labs. None of your lab results were concerning. Please stay hydrated and use imodium for symptomatic treatment of diarrhea. You can obtain this over the counter.    Please return to the ER if you have severe abdominal pain, persistent diarrhea non-responsive to imodium, or blood in your stools.   
Yes

## 2024-08-13 NOTE — ED PROVIDER NOTE - CARE PLAN
Al Delgado,     If you are due for any health screening(s) below please notify me so we can arrange them to be ordered and scheduled to maintain your health. Most healthy patients complete it. Don't lose out on improving your health.     All of your core healthy metrics are met.                             
Principal Discharge DX:	Fall

## 2024-12-11 NOTE — OB RN TRIAGE NOTE - NS_PARA_OBGYN_ALL_OB_NU
Problem: Adult Inpatient Plan of Care  Goal: Plan of Care Review  Outcome: Progressing  Goal: Optimal Comfort and Wellbeing  Outcome: Progressing  Goal: Readiness for Transition of Care  Outcome: Progressing     Problem: Infection  Goal: Absence of Infection Signs and Symptoms  Outcome: Progressing      0
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
